# Patient Record
Sex: FEMALE | Race: WHITE | Employment: OTHER | URBAN - METROPOLITAN AREA
[De-identification: names, ages, dates, MRNs, and addresses within clinical notes are randomized per-mention and may not be internally consistent; named-entity substitution may affect disease eponyms.]

---

## 2022-11-30 ENCOUNTER — HOSPITAL ENCOUNTER (INPATIENT)
Age: 67
LOS: 1 days | Discharge: HOME | DRG: 066 | End: 2022-12-01
Attending: EMERGENCY MEDICINE | Admitting: INTERNAL MEDICINE
Payer: MEDICARE

## 2022-11-30 ENCOUNTER — APPOINTMENT (OUTPATIENT)
Dept: CT IMAGING | Age: 67
DRG: 066 | End: 2022-11-30
Payer: MEDICARE

## 2022-11-30 ENCOUNTER — APPOINTMENT (OUTPATIENT)
Dept: MRI IMAGING | Age: 67
DRG: 066 | End: 2022-11-30
Payer: MEDICARE

## 2022-11-30 DIAGNOSIS — H53.2 DIPLOPIA: ICD-10-CM

## 2022-11-30 DIAGNOSIS — I63.9 ACUTE CVA (CEREBROVASCULAR ACCIDENT) (HCC): ICD-10-CM

## 2022-11-30 DIAGNOSIS — R42 VERTIGO: Primary | ICD-10-CM

## 2022-11-30 DIAGNOSIS — R29.810 FACIAL DROOP: ICD-10-CM

## 2022-11-30 LAB
ANION GAP SERPL CALCULATED.3IONS-SCNC: 10 MMOL/L (ref 3–16)
APTT: 25.1 SEC (ref 23–34.3)
BASOPHILS ABSOLUTE: 0 K/UL (ref 0–0.2)
BASOPHILS RELATIVE PERCENT: 0.5 %
BUN BLDV-MCNC: 14 MG/DL (ref 7–20)
CALCIUM SERPL-MCNC: 9.6 MG/DL (ref 8.3–10.6)
CHLORIDE BLD-SCNC: 101 MMOL/L (ref 99–110)
CO2: 26 MMOL/L (ref 21–32)
CREAT SERPL-MCNC: 0.6 MG/DL (ref 0.6–1.2)
EKG ATRIAL RATE: 70 BPM
EKG DIAGNOSIS: NORMAL
EKG P AXIS: -2 DEGREES
EKG P-R INTERVAL: 148 MS
EKG Q-T INTERVAL: 420 MS
EKG QRS DURATION: 90 MS
EKG QTC CALCULATION (BAZETT): 453 MS
EKG R AXIS: 9 DEGREES
EKG T AXIS: 35 DEGREES
EKG VENTRICULAR RATE: 70 BPM
EOSINOPHILS ABSOLUTE: 0.2 K/UL (ref 0–0.6)
EOSINOPHILS RELATIVE PERCENT: 2.5 %
GFR SERPL CREATININE-BSD FRML MDRD: >60 ML/MIN/{1.73_M2}
GLUCOSE BLD-MCNC: 85 MG/DL (ref 70–99)
GLUCOSE BLD-MCNC: 92 MG/DL (ref 70–99)
HCT VFR BLD CALC: 42.8 % (ref 36–48)
HEMOGLOBIN: 14.4 G/DL (ref 12–16)
INR BLD: 0.99 (ref 0.87–1.14)
LYMPHOCYTES ABSOLUTE: 2.1 K/UL (ref 1–5.1)
LYMPHOCYTES RELATIVE PERCENT: 33.9 %
MCH RBC QN AUTO: 30.3 PG (ref 26–34)
MCHC RBC AUTO-ENTMCNC: 33.7 G/DL (ref 31–36)
MCV RBC AUTO: 90 FL (ref 80–100)
MONOCYTES ABSOLUTE: 0.5 K/UL (ref 0–1.3)
MONOCYTES RELATIVE PERCENT: 9 %
NEUTROPHILS ABSOLUTE: 3.3 K/UL (ref 1.7–7.7)
NEUTROPHILS RELATIVE PERCENT: 54.1 %
PDW BLD-RTO: 13.7 % (ref 12.4–15.4)
PERFORMED ON: NORMAL
PLATELET # BLD: 345 K/UL (ref 135–450)
PMV BLD AUTO: 7.9 FL (ref 5–10.5)
POTASSIUM REFLEX MAGNESIUM: 4.2 MMOL/L (ref 3.5–5.1)
PRO-BNP: 120 PG/ML (ref 0–124)
PROTHROMBIN TIME: 13 SEC (ref 11.7–14.5)
RBC # BLD: 4.76 M/UL (ref 4–5.2)
SODIUM BLD-SCNC: 137 MMOL/L (ref 136–145)
TROPONIN: <0.01 NG/ML
WBC # BLD: 6.1 K/UL (ref 4–11)

## 2022-11-30 PROCEDURE — 83880 ASSAY OF NATRIURETIC PEPTIDE: CPT

## 2022-11-30 PROCEDURE — 80048 BASIC METABOLIC PNL TOTAL CA: CPT

## 2022-11-30 PROCEDURE — 36415 COLL VENOUS BLD VENIPUNCTURE: CPT

## 2022-11-30 PROCEDURE — 2060000000 HC ICU INTERMEDIATE R&B

## 2022-11-30 PROCEDURE — 6370000000 HC RX 637 (ALT 250 FOR IP): Performed by: INTERNAL MEDICINE

## 2022-11-30 PROCEDURE — 70450 CT HEAD/BRAIN W/O DYE: CPT

## 2022-11-30 PROCEDURE — 6360000002 HC RX W HCPCS: Performed by: INTERNAL MEDICINE

## 2022-11-30 PROCEDURE — 85730 THROMBOPLASTIN TIME PARTIAL: CPT

## 2022-11-30 PROCEDURE — 85610 PROTHROMBIN TIME: CPT

## 2022-11-30 PROCEDURE — 6360000004 HC RX CONTRAST MEDICATION: Performed by: EMERGENCY MEDICINE

## 2022-11-30 PROCEDURE — 99285 EMERGENCY DEPT VISIT HI MDM: CPT

## 2022-11-30 PROCEDURE — 70498 CT ANGIOGRAPHY NECK: CPT

## 2022-11-30 PROCEDURE — 4A03X5D MEASUREMENT OF ARTERIAL FLOW, INTRACRANIAL, EXTERNAL APPROACH: ICD-10-PCS | Performed by: INTERNAL MEDICINE

## 2022-11-30 PROCEDURE — 70551 MRI BRAIN STEM W/O DYE: CPT

## 2022-11-30 PROCEDURE — 84484 ASSAY OF TROPONIN QUANT: CPT

## 2022-11-30 PROCEDURE — 85025 COMPLETE CBC W/AUTO DIFF WBC: CPT

## 2022-11-30 PROCEDURE — 93005 ELECTROCARDIOGRAM TRACING: CPT | Performed by: EMERGENCY MEDICINE

## 2022-11-30 RX ORDER — ONDANSETRON 4 MG/1
4 TABLET, ORALLY DISINTEGRATING ORAL EVERY 8 HOURS PRN
Status: DISCONTINUED | OUTPATIENT
Start: 2022-11-30 | End: 2022-12-01 | Stop reason: HOSPADM

## 2022-11-30 RX ORDER — CHLORAL HYDRATE 500 MG
1000 CAPSULE ORAL DAILY
COMMUNITY

## 2022-11-30 RX ORDER — ONDANSETRON 2 MG/ML
4 INJECTION INTRAMUSCULAR; INTRAVENOUS EVERY 6 HOURS PRN
Status: DISCONTINUED | OUTPATIENT
Start: 2022-11-30 | End: 2022-12-01 | Stop reason: HOSPADM

## 2022-11-30 RX ORDER — ASPIRIN 300 MG/1
300 SUPPOSITORY RECTAL DAILY
Status: DISCONTINUED | OUTPATIENT
Start: 2022-11-30 | End: 2022-12-01 | Stop reason: HOSPADM

## 2022-11-30 RX ORDER — ASPIRIN 81 MG/1
81 TABLET ORAL DAILY
Status: DISCONTINUED | OUTPATIENT
Start: 2022-11-30 | End: 2022-12-01 | Stop reason: HOSPADM

## 2022-11-30 RX ORDER — POLYETHYLENE GLYCOL 3350 17 G/17G
17 POWDER, FOR SOLUTION ORAL DAILY PRN
Status: DISCONTINUED | OUTPATIENT
Start: 2022-11-30 | End: 2022-12-01 | Stop reason: HOSPADM

## 2022-11-30 RX ORDER — LABETALOL HYDROCHLORIDE 5 MG/ML
10 INJECTION, SOLUTION INTRAVENOUS EVERY 10 MIN PRN
Status: DISCONTINUED | OUTPATIENT
Start: 2022-11-30 | End: 2022-12-01 | Stop reason: HOSPADM

## 2022-11-30 RX ORDER — ENOXAPARIN SODIUM 100 MG/ML
30 INJECTION SUBCUTANEOUS 2 TIMES DAILY
Status: DISCONTINUED | OUTPATIENT
Start: 2022-11-30 | End: 2022-12-01 | Stop reason: HOSPADM

## 2022-11-30 RX ORDER — ROSUVASTATIN CALCIUM 20 MG/1
40 TABLET, COATED ORAL NIGHTLY
Status: DISCONTINUED | OUTPATIENT
Start: 2022-11-30 | End: 2022-12-01 | Stop reason: HOSPADM

## 2022-11-30 RX ORDER — VITAMIN B COMPLEX
1 CAPSULE ORAL DAILY
COMMUNITY

## 2022-11-30 RX ORDER — M-VIT,TX,IRON,MINS/CALC/FOLIC 27MG-0.4MG
1 TABLET ORAL DAILY
COMMUNITY

## 2022-11-30 RX ORDER — ASPIRIN 81 MG/1
81 TABLET ORAL DAILY
COMMUNITY

## 2022-11-30 RX ADMIN — ROSUVASTATIN CALCIUM 40 MG: 20 TABLET, COATED ORAL at 22:49

## 2022-11-30 RX ADMIN — IOPAMIDOL 75 ML: 755 INJECTION, SOLUTION INTRAVENOUS at 12:35

## 2022-11-30 RX ADMIN — ENOXAPARIN SODIUM 30 MG: 100 INJECTION SUBCUTANEOUS at 22:49

## 2022-11-30 RX ADMIN — ASPIRIN 81 MG: 81 TABLET, COATED ORAL at 21:49

## 2022-11-30 ASSESSMENT — ENCOUNTER SYMPTOMS
TROUBLE SWALLOWING: 0
RESPIRATORY NEGATIVE: 1
VOICE CHANGE: 0
SORE THROAT: 0
DIARRHEA: 0
COUGH: 0
VOMITING: 0
ABDOMINAL PAIN: 0
RHINORRHEA: 0
SHORTNESS OF BREATH: 0
NAUSEA: 1

## 2022-11-30 NOTE — ED NOTES
Pt resting in bed with lights dimmed and family present at bs.  Pt waiting on admission orders and bed assignment     Hollie Dawson RN  11/30/22 2110

## 2022-11-30 NOTE — H&P
Hospital Medicine History & Physical      PCP: No primary care provider on file. Date of Admission: 11/30/2022    Date of Service: Pt seen/examined on 11/30/22 and Admitted to Inpatient with expected LOS greater than two midnights due to medical therapy. Chief Complaint:  Diplopia (Pt states that she has been seeing double and dizzy since 0730 this am. )      History Of Present Illness:    78 y.o. female Bee Lujan  -History of suburethral sling procedure, urgent continence, osteoarthritis  -She could not sleep last night, she states she was just looking in the mirror overnight from 2:30 AM to 5 AM, she probably slept around that time, 7:30 AM woke up with dizziness and double vision. She had a blood test and was able to go to sleep, when she woke up again at 10 AM she still had dizziness, some noted facial droop on the left, she was brought to the emergency room for further evaluation. In the emergency room she is noted to be hypertensive to 135/114, labs unremarkable, with no other lateralizing symptoms. CT head did not show acute intracranial hemorrhage or mass-effect. CTA showed mild atherosclerosis no large vessel occlusion. With persistent dizziness and diplopia patient admitted to hospital for further evaluation with MRI, neurology consult and frequent neurochecks. Past Medical History:    As above in HPI    Past Surgical History:    Past Surgical History:   Procedure Laterality Date    COLONOSCOPY 01/14/14   Dr. Tatianna Brar repeat 10 years    DILATION/CURETTAGE,DIAGNOSTIC 2005    HAND SURGERY 1980   ganglion cyst excision    HAND SURGERY 2004   trigger thumb    REMOVAL OF TONSILS,<12 Y/O    SLING OPER 5742 [facility]   2005 [facility] for bleeding. 2008 Tension Free Viginal Tape bladder    Medications Prior to Admission:   Pharmacist to reconcile medications    Allergies:  Patient has no known allergies.     Social History:      The patient currently lives at home with family  Currently does not work but does a lot of volunteer work at the [employer]:   reports that she has never smoked. She has never used smokeless tobacco.  ETOH:   reports current alcohol use. Family History: History of father with CVA in his 62s he was a physician and had to stop working because of this    History reviewed. No pertinent family history. REVIEW OF SYSTEMS:   Pertinent positives as noted in the HPI. All other systems reviewed and negative. PHYSICAL EXAM PERFORMED:    /72   Pulse 79   Temp 98.6 °F (37 °C) (Oral)   Resp 21   Ht 5' 8\" (1.727 m)   Wt 251 lb (113.9 kg)   SpO2 98%   BMI 38.16 kg/m²     General appearance:  No apparent distress, appears stated age and cooperative. HEENT:  Normal cephalic, atraumatic without obvious deformity. Pupils equal, round, and reactive to light. Extra ocular muscles intact. Conjunctivae/corneas clear. Neck: Supple, with full range of motion. No jugular venous distention. Trachea midline. Respiratory:  Normal respiratory effort. Clear to auscultation, bilaterally without Rales/Wheezes/Rhonchi. Cardiovascular:  Regular rate and rhythm with normal S1/S2 without murmurs, rubs or gallops. Abdomen: Soft, non-tender, non-distended with normal bowel sounds. Musculoskeletal:  No clubbing, cyanosis or edema bilaterally. Full range of motion without deformity. Skin: Skin color, texture, turgor normal.  No rashes or lesions.   Neurologic: Difficulty with finger-nose testing pointing, motor strength is 5 out of 5, no sensory deficits,Cranial nerves: subtle left nasolabial flattening otherwise grossly non-focal.  Psychiatric:  Alert and oriented, thought content appropriate, normal insight  Capillary Refill: Brisk,< 3 seconds   Peripheral Pulses: +2 palpable, equal bilaterally       Labs:     Recent Labs     11/30/22  1312   WBC 6.1   HGB 14.4   HCT 42.8        Recent Labs     11/30/22  1312      K 4.2      CO2 26   BUN 14   CREATININE 0.6   CALCIUM 9.6     No results for input(s): AST, ALT, BILIDIR, BILITOT, ALKPHOS in the last 72 hours. Recent Labs     11/30/22  1312   INR 0.99     Recent Labs     11/30/22  1312   TROPONINI <0.01       Urinalysis:    No results found for: Isabella Bar, BACTERIA, RBCUA, BLOODU, [address], GLUCOSEU    Radiology:     CXR: I have reviewed the CXR with the following interpretation: n/a  EKG:  I have reviewed the EKG with the following interpretation: Reviewed    CTA HEAD NECK W CONTRAST   Final Result      Mild atherosclerosis. No steno-occlusive disease. CT HEAD WO CONTRAST   Final Result      No acute intracranial hemorrhage or mass effect. Result communicated to Dr. Levi Rowan 12:34 PM on 11/30/22          ASSESSMENT/PLAN:    Active Hospital Problems    Diagnosis Date Noted    Diplopia [H53.2] 11/30/2022     Priority: Medium     Diplopia/dizziness, left-sided facial droop, concern for CVA, CT head and CTA did not show acute findings. Family hx of CVA in father. MRI without contrast has been ordered. Neurology consult. Neurochecks every 4 hours  Aspirin, high intensity statin  Check A1c, lipid profile  PT/OT/SLP has been consulted  Can do bedside swallow exam and start regular diet  Monitor on telemetry  Permissive hypertension, labetalol for SBP greater than 497 and diastolic 062    Obesity Body mass index is 38.16 kg/m². - Complicating assessment and treatment. Placing patient at risk for multiple co-morbidities as well as early death and contributing to the patient's presentation.  on weight loss when appropriate. DVT Prophylaxis: Lovenox  Diet: Advance diet if passes bedside swallow  Code Status: full code    PT/OT Eval Status: ordered    [facility] as inpatient. I anticipate hospitalization spanning more than two midnights for investigation and treatment of the above medically necessary diagnoses.          Manasa Francis MD    Thank you No primary care provider on file. for the opportunity to be involved in this patient's care. If you have any questions or concerns please feel free to contact me at [phone].

## 2022-11-30 NOTE — ED PROVIDER NOTES
[address]          ATTENDING PHYSICIAN NOTE       Date of evaluation: 11/30/2022    Chief Complaint     Diplopia (Pt states that she has been seeing double and dizzy since 0730 this am. )      History of Present Illness     Hannah Shipley is a 78 y.o. female who presents to the emergency department with complaints of dizziness and double vision and a left-sided facial droop. Patient states that she had difficulty sleeping during the early morning hours this morning, and was awake from approximately 2:30 AM till 5 AM, tossing and turning and having difficulty sleeping, but otherwise felt normal at that time, with no double vision, dizziness, or complaints of weakness. She describes that she woke with her alarm at 730 this morning, and at that time felt dizzy and unsteady when she attempted to get up out of bed to go to the bathroom. She was ultimately able to get up, had some breakfast and saw her son. He recalls noting that she seemed unsteady and was complaining of dizziness at that time, but does not recall noticing any facial droop. She went back to lay down in bed until about 10 AM, when she got up again this time her symptoms were not improved and her son at this point noticed a left-sided facial droop and some slurring of speech. For this reason she was brought to the emergency department for evaluation. Patient states that she has been feeling well until she woke up at 730 this morning. She denies any present or antecedent chest pain, shortness of breath, palpitations. She does note a mild generalized headache. She does note some mild nausea, but has had no vomiting. She states that at one point she felt as though her left arm was heavy, at a different point she felt as though her right arm was heavy and she had difficulty drinking her coffee. She states that her legs have felt heavy, but cannot notice any lateralization to this.     Review of Systems Review of Systems   Constitutional:  Negative for activity change, appetite change, chills, fatigue and fever. HENT: Negative. Negative for congestion, rhinorrhea, sore throat, trouble swallowing and voice change. Eyes:  Positive for visual disturbance. Respiratory: Negative. Negative for cough and shortness of breath. Cardiovascular: Negative. Negative for chest pain and palpitations. Gastrointestinal:  Positive for nausea. Negative for abdominal pain, diarrhea and vomiting. Genitourinary: Negative. Negative for difficulty urinating and dysuria. Musculoskeletal: Negative. Skin: Negative. Neurological:  Positive for dizziness, facial asymmetry, speech difficulty, weakness and headaches. Negative for syncope. Psychiatric/Behavioral: Negative. Past Medical, Surgical, Family, and Social History     She has no past medical history on file. She has no past surgical history on file. Her family history is not on file. She reports that she has never smoked. She has never used smokeless tobacco. She reports current alcohol use. She reports that she does not use drugs. Medications     Previous Medications    No medications on file       Allergies     She has No Known Allergies. Physical Exam     INITIAL VITALS: BP: (!) 135/114, Temp: 98.6 °F (37 °C), Heart Rate: 65, Resp: 16, SpO2: 99 %     General: Well appearing. Uncomfortable appearing, keeps her eyes closed for comfort, but in no acute respiratory distress. HEENT: Pupils are equal, round, and reactive to light. On extraocular muscle testing, the patient cannot elevate her gaze with either eye. She has some bilateral mild nystagmus on lateral gaze. Her diplopia resolves with rightward gaze. Conjunctivae are clear and moist. No redness or drainage from the eyes. No drainage from the nose. Left-sided facial droop is present. The oropharynx appeared to be normal.    Neck: Supple, with full range of motion. Cardiovascular: Normal S1-S2 without murmur rub or gallop. 2+ radial pulses bilaterally. Respiratory: Unlabored breathing with equal chest rise and fall. Lungs are clear to auscultation bilaterally. No adventitious lung sounds heard. Abdomen: Soft and nontender, without guarding or rebound tenderness. No masses or hepatosplenomegaly. Skin: Warm and dry, without rashes or ecchymoses, lacerations or abrasions. Neuro: Alert and oriented x3. NIHSS:  LOC - 0, 0, 0  Gaze - 0  Vision - 0  Face - 1  Arms - R 0, L 0  Legs - R 0, L 0  Sensory - 0  Ataxia - 0  Aphasia - 0  Dysarthria - 1  Extinction/Neglect - 0  Total = 2    This finding of the left nasolabial fold, although she can activate muscles with smile. No drift on upper or lower extremities, normal sensation throughout. Mildly slurred speech, which the son notices, but easily understandable. On testing of extraocular muscles, the patient has full lateral movements bilaterally, and diplopia resolves on rightward gaze. However, the patient cannot elevate gaze bilaterally    Extremities: Warm and well-perfused, without clubbing, cyanosis, or edema. The patient moves all extremities equally. Psych: The patient's mood and affect are generally within normal limits for their presentation. Diagnostic Results     EKG   Normal sinus rhythm with a ventricular rate of 70 bpm.  Normal axis. Normal intervals, with FL interval 148 ms, QRS duration 90 ms,  ms. There is some T wave flattening in lead III only. No other ST segment or T wave abnormalities are noted. There is no prior EKG in our system to compare to. RADIOLOGY:  CTA HEAD NECK W CONTRAST   Final Result      Mild atherosclerosis. No steno-occlusive disease. CT HEAD WO CONTRAST   Final Result      No acute intracranial hemorrhage or mass effect.       Result communicated to Dr. Jay Lauren 12:34 PM on 11/30/22          LABS:   Results for orders placed or performed during the hospital encounter of 11/30/22   CBC with Auto Differential   Result Value Ref Range    WBC 6.1 4.0 - 11.0 K/uL    RBC 4.76 4.00 - 5.20 M/uL    Hemoglobin 14.4 12.0 - 16.0 g/dL    Hematocrit 42.8 36.0 - 48.0 %    MCV 90.0 80.0 - 100.0 fL    MCH 30.3 26.0 - 34.0 pg    MCHC 33.7 31.0 - 36.0 g/dL    RDW 13.7 12.4 - 15.4 %    Platelets 881 731 - 392 K/uL    MPV 7.9 5.0 - 10.5 fL    Neutrophils % 54.1 %    Lymphocytes % 33.9 %    Monocytes % 9.0 %    Eosinophils % 2.5 %    Basophils % 0.5 %    Neutrophils Absolute 3.3 1.7 - 7.7 K/uL    Lymphocytes Absolute 2.1 1.0 - 5.1 K/uL    Monocytes Absolute 0.5 0.0 - 1.3 K/uL    Eosinophils Absolute 0.2 0.0 - 0.6 K/uL    Basophils Absolute 0.0 0.0 - 0.2 K/uL   Basic Metabolic Panel w/ Reflex to MG   Result Value Ref Range    Sodium 137 136 - 145 mmol/L    Potassium reflex Magnesium 4.2 3.5 - 5.1 mmol/L    Chloride 101 99 - 110 mmol/L    CO2 26 21 - 32 mmol/L    Anion Gap 10 3 - 16    Glucose 92 70 - 99 mg/dL    BUN 14 7 - 20 mg/dL    Creatinine 0.6 0.6 - 1.2 mg/dL    Est, Glom Filt Rate >60 >60    Calcium 9.6 8.3 - 10.6 mg/dL   Protime-INR   Result Value Ref Range    Protime 13.0 11.7 - 14.5 sec    INR 0.99 0.87 - 1.14   PTT   Result Value Ref Range    aPTT 25.1 23.0 - 34.3 sec   Brain Natriuretic Peptide   Result Value Ref Range    Pro- 0 - 124 pg/mL   Troponin   Result Value Ref Range    Troponin <0.01 <0.01 ng/mL   POCT Glucose   Result Value Ref Range    POC Glucose 85 70 - 99 mg/dl    Performed on ACCU-CHEK    EKG 12 Lead   Result Value Ref Range    Ventricular Rate 70 BPM    Atrial Rate 70 BPM    P-R Interval 148 ms    QRS Duration 90 ms    Q-T Interval 420 ms    QTc Calculation (Bazett) 453 ms    P Axis -2 degrees    R Axis 9 degrees    T Axis 35 degrees    Diagnosis       EKG performed in ER and to be interpreted by ER physician. Confirmed by MD, ER ([id number]),  Guy Joseph ([id number]) on 11/30/2022 12:36:35 PM       RECENT VITALS:  BP: 135/72, Temp: 98.6 °F (37 °C), Heart Rate: 79, Resp: 21, SpO2: 98 %     Procedures         ED Course     Nursing Notes, Past Medical Hx, Past Surgical Hx, Social Hx, Allergies, and Family Hx were reviewed. The patient was given the following medications:  Orders Placed This Encounter   Medications    iopamidol (ISOVUE-370) 76 % injection 75 mL       CONSULTS:  Alexander Cardona / LANG / Nelly Ashley is a 78 y.o. female with no prior history of stroke, who presents to the emergency department with symptoms of significant dizziness, vertical diplopia, left-sided facial droop, mildly slurred speech, difficulty ambulating, concerning for posterior circulation stroke. She was last known well at 5 AM, which is when she fell asleep after having been awake in the early morning hours, and symptom recognition was 7:30 AM, which is when she was awoken by her alarm and noted the symptoms. Stroke alert was called based on the patient's symptomatology, although she is outside the window for systemic lytic therapy. CT head shows no bleed. CT angiogram shows no large vessel occlusion. Patient's case was discussed with the stroke team, who did not recommend acute intervention, but recommended admission for MRI and neurology consultation. Critical Care:  Due to the immediate potential for life-threatening deterioration due to concern for posterior circulation stroke, I spent a total recommend a 37 minutes providing critical care. This time excludes time spent performing procedures but includes time spent on direct patient care, history retrieval, review of the chart, and discussions with patient, family, and consultant(s). Clinical Impression     1. Vertigo    2. Diplopia    3. Facial droop        Disposition     PATIENT REFERRED TO:  No follow-up provider specified.     DISCHARGE MEDICATIONS:  New Prescriptions    No medications on file       DISPOSITION Decision To Admit    (Please note that portions of this note were completed with voice recognition software.   Efforts were made to edit the dictations but occasionally words are mis-transcribed.)     Chantell Albarran MD  11/30/22 1674

## 2022-11-30 NOTE — CONSULTS
Clinical Pharmacy Consult Note  Medication History     Admit Date: 11/30/2022    Pharmacy consulted to verify home medication list by Dr. Niko Medeiros. List of of current medications patient is taking is complete. Home Medication list in EPIC updated to reflect changes noted below. Source of information: patient/family interview, [facility] records    Patient's home pharmacy: [address] [place] [address] - Ph: [phone]    Changes made to medication list:   Medications removed: (include reason, ex: therapy completed, patient no longer taking, etc.)  None at this time  Medications added:   Aspirin  B complex  Fish oil  Multivitamin   Vitamin D  Medication doses adjusted:   None at this time  Other notes:   Patient states that she takes no prescription medications, only supplements/over-the-counter medications. Over-the-counter medications that patient could recall were added to home medication list at time of consult. Current Outpatient Medications   Medication Instructions    aspirin 81 mg, Oral, DAILY    b complex vitamins capsule 1 capsule, Oral, DAILY    fish oil 1,000 mg, Oral, DAILY    Multiple Vitamins-Minerals (THERAPEUTIC MULTIVITAMIN-MINERALS) tablet 1 tablet, Oral, DAILY    vitamin D (CHOLECALCIFEROL) 1,000 Units, Oral, DAILY       Please call with any questions.   Ashley Jiménez, PharmD, [address]  Clinical Pharmacist - Emergency Dept  Wireless: [phone]  11/30/2022 3:59 PM

## 2022-12-01 VITALS
OXYGEN SATURATION: 98 % | RESPIRATION RATE: 18 BRPM | HEART RATE: 74 BPM | DIASTOLIC BLOOD PRESSURE: 61 MMHG | HEIGHT: 68 IN | SYSTOLIC BLOOD PRESSURE: 146 MMHG | BODY MASS INDEX: 36.95 KG/M2 | WEIGHT: 243.83 LBS | TEMPERATURE: 98.2 F

## 2022-12-01 PROBLEM — E78.5 HLD (HYPERLIPIDEMIA): Status: ACTIVE | Noted: 2022-12-01

## 2022-12-01 PROBLEM — I63.9 BRAINSTEM STROKE (HCC): Status: ACTIVE | Noted: 2022-12-01

## 2022-12-01 PROBLEM — I63.81 THALAMIC STROKE (HCC): Status: ACTIVE | Noted: 2022-11-30

## 2022-12-01 PROBLEM — I10 BENIGN ESSENTIAL HTN: Status: ACTIVE | Noted: 2022-12-01

## 2022-12-01 PROBLEM — R42 VERTIGO: Status: ACTIVE | Noted: 2022-12-01

## 2022-12-01 LAB
CHOLESTEROL, TOTAL: 248 MG/DL (ref 0–199)
ESTIMATED AVERAGE GLUCOSE: 111.2 MG/DL
HBA1C MFR BLD: 5.5 %
HCT VFR BLD CALC: 43.9 % (ref 36–48)
HDLC SERPL-MCNC: 78 MG/DL (ref 40–60)
HEMOGLOBIN: 14.8 G/DL (ref 12–16)
LDL CHOLESTEROL CALCULATED: 151 MG/DL
MCH RBC QN AUTO: 30.6 PG (ref 26–34)
MCHC RBC AUTO-ENTMCNC: 33.7 G/DL (ref 31–36)
MCV RBC AUTO: 91 FL (ref 80–100)
PDW BLD-RTO: 13.4 % (ref 12.4–15.4)
PLATELET # BLD: 322 K/UL (ref 135–450)
PMV BLD AUTO: 7.4 FL (ref 5–10.5)
RBC # BLD: 4.83 M/UL (ref 4–5.2)
TRIGL SERPL-MCNC: 94 MG/DL (ref 0–150)
VLDLC SERPL CALC-MCNC: 19 MG/DL
WBC # BLD: 7.5 K/UL (ref 4–11)

## 2022-12-01 PROCEDURE — 97166 OT EVAL MOD COMPLEX 45 MIN: CPT

## 2022-12-01 PROCEDURE — 97116 GAIT TRAINING THERAPY: CPT

## 2022-12-01 PROCEDURE — 36415 COLL VENOUS BLD VENIPUNCTURE: CPT

## 2022-12-01 PROCEDURE — 6370000000 HC RX 637 (ALT 250 FOR IP): Performed by: INTERNAL MEDICINE

## 2022-12-01 PROCEDURE — 99223 1ST HOSP IP/OBS HIGH 75: CPT | Performed by: PSYCHIATRY & NEUROLOGY

## 2022-12-01 PROCEDURE — 97530 THERAPEUTIC ACTIVITIES: CPT

## 2022-12-01 PROCEDURE — 6360000002 HC RX W HCPCS: Performed by: INTERNAL MEDICINE

## 2022-12-01 PROCEDURE — 85027 COMPLETE CBC AUTOMATED: CPT

## 2022-12-01 PROCEDURE — 80061 LIPID PANEL: CPT

## 2022-12-01 PROCEDURE — 97162 PT EVAL MOD COMPLEX 30 MIN: CPT

## 2022-12-01 PROCEDURE — 83036 HEMOGLOBIN GLYCOSYLATED A1C: CPT

## 2022-12-01 PROCEDURE — 93306 TTE W/DOPPLER COMPLETE: CPT

## 2022-12-01 PROCEDURE — 97535 SELF CARE MNGMENT TRAINING: CPT

## 2022-12-01 PROCEDURE — 92610 EVALUATE SWALLOWING FUNCTION: CPT

## 2022-12-01 PROCEDURE — 6370000000 HC RX 637 (ALT 250 FOR IP): Performed by: STUDENT IN AN ORGANIZED HEALTH CARE EDUCATION/TRAINING PROGRAM

## 2022-12-01 RX ORDER — AMLODIPINE BESYLATE 5 MG/1
5 TABLET ORAL DAILY
Qty: 30 TABLET | Refills: 3 | Status: SHIPPED | OUTPATIENT
Start: 2022-12-01

## 2022-12-01 RX ORDER — ROSUVASTATIN CALCIUM 40 MG/1
40 TABLET, COATED ORAL NIGHTLY
Qty: 30 TABLET | Refills: 3 | Status: SHIPPED | OUTPATIENT
Start: 2022-12-01

## 2022-12-01 RX ORDER — AMLODIPINE BESYLATE 5 MG/1
5 TABLET ORAL DAILY
Status: DISCONTINUED | OUTPATIENT
Start: 2022-12-01 | End: 2022-12-01 | Stop reason: HOSPADM

## 2022-12-01 RX ORDER — CLOPIDOGREL BISULFATE 75 MG/1
75 TABLET ORAL DAILY
Status: DISCONTINUED | OUTPATIENT
Start: 2022-12-01 | End: 2022-12-01 | Stop reason: HOSPADM

## 2022-12-01 RX ORDER — CLOPIDOGREL BISULFATE 75 MG/1
75 TABLET ORAL DAILY
Qty: 21 TABLET | Refills: 0 | Status: SHIPPED | OUTPATIENT
Start: 2022-12-01 | End: 2022-12-22

## 2022-12-01 RX ORDER — ACETAMINOPHEN 325 MG/1
650 TABLET ORAL EVERY 4 HOURS PRN
Status: DISCONTINUED | OUTPATIENT
Start: 2022-12-01 | End: 2022-12-01 | Stop reason: HOSPADM

## 2022-12-01 RX ADMIN — ASPIRIN 81 MG: 81 TABLET, COATED ORAL at 08:31

## 2022-12-01 RX ADMIN — CLOPIDOGREL BISULFATE 75 MG: 75 TABLET ORAL at 13:06

## 2022-12-01 RX ADMIN — ENOXAPARIN SODIUM 30 MG: 100 INJECTION SUBCUTANEOUS at 08:31

## 2022-12-01 RX ADMIN — AMLODIPINE BESYLATE 5 MG: 5 TABLET ORAL at 13:06

## 2022-12-01 RX ADMIN — ACETAMINOPHEN 650 MG: 325 TABLET ORAL at 10:26

## 2022-12-01 ASSESSMENT — PAIN DESCRIPTION - LOCATION
LOCATION: HEAD
LOCATION: HEAD

## 2022-12-01 ASSESSMENT — PAIN SCALES - GENERAL
PAINLEVEL_OUTOF10: 2
PAINLEVEL_OUTOF10: 3

## 2022-12-01 NOTE — CONSULTS
Neurology / Neurocritical Care Consult Note    Paul Gongora MD is requesting this consult. Reason for Consult: Diplopia  Admission Chief Complaint: Diplopia    History of Present Illness     Dixie Smith is a 78 y.o. y/o female with PMH significant for hyperlipidemia presented with unsteady gait, slurred speech, left facial weakness. Patient reported approximately 2 AM.  She was trying to sleep was not able to sleep 5:30 AM when she endorsed cough. She woke up around 7:30 AM with dizziness and double vision. She tried to go back to sleep and woke up again around 11 AM with persistent dizziness. Patient tried to walk down the stairs and noticed her gait was extremely unsteady. The patient's son noticed patient had left facial droop as well as slurred speech with slurring. Patient was brought to the emergency department for further evaluation. In the ED, patient's blood pressure 140s to 150s/100s. Code stroke was called. Patient received a dose of aspirin 81 and statin. CT head without contrast nonrevealing. CTA head and neck with contrast revealing mild atherosclerosis. No steno-occlusive disease. MRI brain without contrast revealed small acute infarct in the right medial thalamus. Neurology was consulted and patient was admitted for further management of ischemic stroke. Per my interview with the patient, shewas lying in bed, reported having mild headache likely from persistent diplopia. Denied any nausea or vomiting episodes. Reported her speech has significantly improved and is not slurring anymore, likely back to baseline. She also reported during her car ride to the ED, she was leaning left side as well as had some left lower extremity weakness. Left-sided weakness has since resolved. REVIEW OF SYSTEMS:   Constitutional- No weight loss or fevers   Eyes- + diplopia. No photophobia. Ears/nose/throat- No dysphagia. No Dysarthria   Cardiovascular- No palpitations.  No chest pain   Respiratory- No dyspnea. No Cough   Gastrointestinal- No Abdominal pain. No Vomiting. Genitourinary- No incontinence. No urinary retention   Musculoskeletal- No myalgia. No arthralgia   Skin- No rash. No easy bruising. Psychiatric- No depression. No anxiety   Endocrine- No diabetes. No thyroid issues. Hematologic- No bleeding difficulty. No fatigue   Neurologic-persistent diplopia. Past Medical, Surgical, Family, and Social History   PAST MEDICAL HISTORY:  History reviewed. No pertinent past medical history. SURGICAL HISTORY:  History reviewed. No pertinent surgical history. FAMILY HISTORY & SOCIAL HISTORY:  Family history non-contributory  History reviewed. No pertinent family history.   Social History     Tobacco Use    Smoking status: Never    Smokeless tobacco: Never   Substance Use Topics    Alcohol use: Yes     Comment: occ    Drug use: Never         Allergies & Outpatient Medications   ALLERGIES:  No Known Allergies  HOME MEDICATIONS:  Current Discharge Medication List        CONTINUE these medications which have NOT CHANGED    Details   aspirin 81 MG EC tablet Take 81 mg by mouth daily      Multiple Vitamins-Minerals (THERAPEUTIC MULTIVITAMIN-MINERALS) tablet Take 1 tablet by mouth daily      b complex vitamins capsule Take 1 capsule by mouth daily      vitamin D (CHOLECALCIFEROL) 25 MCG (1000 UT) TABS tablet Take 1,000 Units by mouth daily      Omega-3 Fatty Acids (FISH OIL) 1000 MG capsule Take 1,000 mg by mouth daily               Physical Exam   PHYSICAL EXAM:  Vitals:    11/30/22 2217 12/01/22 0300 12/01/22 0413 12/01/22 0823   BP: (!) 153/91 122/64  (!) 151/78   Pulse: 76 82  73   Resp: 22 20  18   Temp: 98.5 °F (36.9 °C) 97.9 °F (36.6 °C)  98.2 °F (36.8 °C)   TempSrc: Oral Oral  Oral   SpO2: 97% 95%  98%   Weight:   243 lb 13.3 oz (110.6 kg)    Height:   5' 8\" (1.727 m)          General: Alert, no distress, well-nourished  Neurologic  Mental status: AOx4  orientation to person, place, time, situation   Attention intact as able to attend well to the exam     Language fluent in conversation   Comprehension intact; follows simple commands    Cranial nerves:   CN2: Visual fields full w/o extinction on confrontational testing   Fundoscopic Exam not performed  CN 3,4,6: Pupils equal and reactive to light, extraocular muscles left eye slow. CN5: Facial sensation symmetric   CN7: Face asymmetric, left facial droop  CN8: Hearing symmetric to spoken voice  CN9: Palate elevated symmetrically  CN11: Traps full strength on shoulder shrug  CN12: Tongue midline with protrusion    Motor Exam:  Left upper extremity slightly weaker compared to the right side    Sensory: light touch intact and symmetric in all 4 extremities. No sensory extinction on bilateral simultaneous stimulation  Cerebellar/coordination: finger nose finger normal without ataxia  Tone: normal in all 4 extremities  Gait: Unsteady given patient has persistent diplopia      OTHER SYSTEMS:  Cardiovascular: Warm, appears well perfused   Respiratory: Easy, non-labored respiratory pattern   Abdominal: Abdomen is without distention   Extremities: Upper and lower extremities are atraumatic in appearance without deformity. No swelling or erythema. Psychiatric: Cooperative with exam    Diagnostic Testing Results   IMAGES:  Images personally reviewed and agree w/ radiology interpretation. Head CT w/o Contrast:  No acute intracranial hemorrhage or mass-effect. CTA of Head / Neck w/ Contrast:  Mild atherosclerosis. No steno-occlusive disease. MRI Brain w/o Contrast:  Small acute infarct in the medial right thalamus. Mild periventricular white matter changes consistent with chronic small vessel ischemic disease. MRI Brain w & w/o Contrast:  Not performed    ECHO with Bubble:   EF 55 to 60% no regional wall motion abnormalities. Diastolic filling revealing grade 1 diastolic dysfunction. No PFO/shunting. CVEEG:  Not indicated.     LABS:  All results below personally reviewed. Pertinent positives & negatives are addressed in Impression & Recommendations below. LABS   Metabolic Panel Recent Labs     11/30/22  1312      K 4.2      CO2 26   BUN 14   CREATININE 0.6   GLUCOSE 92   CALCIUM 9.6      CBC / Coags Recent Labs     11/30/22  1312 12/01/22  0538   WBC 6.1 7.5   RBC 4.76 4.83   HGB 14.4 14.8   HCT 42.8 43.9    322   INR 0.99  --       Other No results for input(s): LABA1C, LDLCALC, TRIG, TSH, OPLEWGPC26, FOLATE, LABSALI, COVID19 in the last 72 hours. No results for input(s): PHENYTOIN, KEPPRA, LACOSA, LAMO, VALPROATE, LACTSEPSIS, LACTA in the last 72 hours. CURRENT SCHEDULED MEDICATIONS   Inpatient Medications     enoxaparin, 30 mg, SubCUTAneous, BID    aspirin, 81 mg, Oral, Daily **OR** aspirin, 300 mg, Rectal, Daily    rosuvastatin, 40 mg, Oral, Nightly   Infusions      Antibiotics   Recent Abx Admin        No antibiotic orders with administrations found. IMPRESSION & RECOMMENDATIONS     IMPRESSION:  79-year-old female with past medical history of hyperlipidemia not on any statins, undiagnosed hypertension not on any antihypertensive medications presented with diplopia resulting in gait unsteadiness and left-sided facial droop with left side weakness was diagnosed with small acute infarct in the right medial thalamus.       RECOMMENDATIONS:    ASA 81 daily  High intensity statin daily  PT/OT  SLP  Lipid panel  A1c  Blood pressure management per primary team          Tod Archuleta MD   Neurology & Neurocritical Care   12/1/2022 9:43 AM      ICU Patients:   [address] [phone]  [facility]: [facility] Neurocritical Care    Floor / PCU Patients:  Neurology Line: [phone]  [facility]: [facility] Neurology    Time independently spent by Nurse Practitioner reviewing chart and prior testing, obtaining history from patient, examining patient, ordering appropriate medications / diagnostics, reviewing results of diagnostics, counseling and educating patient / family, communicating plan with other providers and documenting clinical information in the EMR was approximately 30 minutes.

## 2022-12-01 NOTE — PROGRESS NOTES
4 Eyes Skin Assessment     The patient is being assess for   Admission    I agree that 2 RN's have performed a thorough Head to Toe Skin Assessment on the patient. ALL assessment sites listed below have been assessed. Areas assessed for pressure by both nurses:   [x]   Head, Face, and Ears   [x]   Shoulders, Back, and Chest, Abdomen  [x]   Arms, Elbows, and Hands   [x]   Coccyx, Sacrum, and Ischium  [x]   Legs, Feet, and Heels        Skin Assessed Under all Medical Devices by both nurses:  NA               All Mepilex Borders were peeled back and area peeked at by both nurses:  No: Na  Please list where Mepilex Borders are located:  NA               **SHARE this note so that the co-signing nurse is able to place an eSignature**    Co-signer eSignature: {Esignature:[id number]}    Does the Patient have Skin Breakdown related to pressure?   No              Marty Prevention initiated:  No   Wound Care Orders initiated:  SEBASTIÁN      [facility] nurse consulted for Pressure Injury (Stage 3,4, Unstageable, DTI, NWPT, Complex wounds)and New or Established Ostomies:  SEBASTIÁN      Primary Nurse eSignature: Electronically signed by Jung Lea RN on 11/30/22 at 11:10 PM EST

## 2022-12-01 NOTE — PLAN OF CARE
Problem: Safety - Adult  Goal: Free from fall injury  Outcome: Progressing   Patient meets Maria [facility] Marshall fall risk guidelines. Non skid footwear with ambulation. Bed in lowest position. Call light in reach. Bed alarm activated. Reminded to call for assistance before exiting bed. Continue safety precautions. Problem: Discharge Planning  Goal: Discharge to home or other facility with appropriate resources  Outcome: Progressing  Flowsheets (Taken 11/30/2022 2217)  Discharge to home or other facility with appropriate resources: Identify barriers to discharge with patient and caregiver   Plans to return home.

## 2022-12-01 NOTE — PROGRESS NOTES
Physical Therapy  Facility/Department: [place] [address] [facility]  Physical Therapy Initial Assessment and Treatment    Name: Scott Dyson  : 1955  MRN: [id number]  Date of Service: 2022    Discharge Recommendations: Scott Dyson scored a 19/24 on the AM-PAC short mobility form. Current research shows that an AM-PAC score of 18 or greater is typically associated with a discharge to the patient's home setting. Based on the patient's AM-PAC score and their current functional mobility deficits, it is recommended that the patient have 2-3 sessions per week of Physical Therapy at d/c to increase the patient's independence. At this time, this patient demonstrates the endurance and safety to discharge home with home PT and a follow up treatment frequency of 2-3x/wk. Please see assessment section for further patient specific details. If patient discharges prior to next session this note will serve as a discharge summary. Please see below for the latest assessment towards goals. PT Equipment Recommendations  Equipment Needed:  (may need rolling walker at d/c - will cont to assess)      Patient Diagnosis(es): The primary encounter diagnosis was Vertigo. Diagnoses of Diplopia and Facial droop were also pertinent to this visit. Past Medical History:  has no past medical history on file. Past Surgical History:  has no past surgical history on file. Assessment   Body Structures, Functions, Activity Limitations Requiring Skilled Therapeutic Intervention: Decreased functional mobility ; Decreased endurance;Decreased balance  Assessment: Pt with decreased independent mobility from baseline. Pt reports normally independent with mobilty without AD. Currently limited by double vision and dizziness. Very guarded with mobility and needing CGA for safety. Should progress well once symptoms improve.   Rec cont skilled PT to maximize mobility and independence  Treatment Diagnosis: impaired functional mobility 2/2 decreased balance and endurance  Decision Making: Medium Complexity  Requires PT Follow-Up: Yes     Plan   Physcial Therapy Plan  General Plan: 5-7 times per week  Current Treatment Recommendations: Balance training, Functional mobility training, Gait training, Stair training, Patient/Caregiver education & training, Equipment evaluation, education, & procurement, Safety education & training  Safety Devices  Type of Devices: Left in chair, Call light within reach, Chair alarm in place, Nurse notified  Restraints  Restraints Initially in Place: No     Restrictions  Position Activity Restriction  Other position/activity restrictions: Up as tolerated     Subjective   General  Chart Reviewed: Yes  Additional Pertinent Hx: Pt is a 78 y.o. female adm 11/30 with diplopia. Pt presented to the emergency department with complaints of dizziness and double vision and a left-sided facial droop. Head CT: neg. CTA head: neg. MRI brain:Small acute infarct in the medial right thalamus. PMH: none significant  Referring Practitioner: Eve Schlatter, MD  Referral Date : 11/30/22  Subjective  Subjective: Pt found supine. Agreeable to PT. Reports seeing double. Denies pain. Pt c/o feeling dizzy throughout session. \"It's better when my eyes are closed. \"         Social/Functional History  Social/Functional History  Lives With: Spouse  Type of Home: House  Home Layout: Two level, Bed/Bath upstairs, 1/2 bath on main level, Laundry in basement  Home Access: Stairs to enter without rails  Entrance Stairs - Number of Steps: 2  Bathroom Shower/Tub: Walk-in shower  Bathroom Toilet: Standard  Bathroom Equipment: Hand-held shower  Home Equipment:  (None)  Has the patient had two or more falls in the past year or any fall with injury in the past year?: No  ADL Assistance: Independent  Homemaking Assistance: Independent  Ambulation Assistance: Independent  Transfer Assistance: Independent  Active : Yes  Occupation: Volunteer work  Type of Occupation: [employer]  Additional Comments: Son is currently staying with pt after recent back surgery.  travels for work. Daughter currently here from out of town  Vision/Hearing  Vision  Vision: Impaired (currently c/o double vision)  Vision Exceptions: Wears glasses at all times  Hearing  Hearing: Within functional limits    Cognition         Objective                 AROM RLE (degrees)  RLE AROM: WFL  AROM LLE (degrees)  LLE AROM : WFL  Strength RLE  Strength RLE:  (hip flex 4/5, otherwise >4+/5)  Strength LLE  Strength LLE:  (hip flex 4/5, otherwise >4+/5)           Bed mobility  Supine to Sit: Stand by assistance  Scooting: Stand by assistance  Transfers  Sit to Stand: Contact guard assistance  Stand to Sit: Contact guard assistance  Ambulation  Device: No Device  Assistance: Contact guard assistance  Quality of Gait: guarded and reaching out for UE support, decreased bilat step length/height, no overt LOB  Distance: 15' x 2  Comments: Pt c/o feeling dizzy throughout session. BP after sitting up at /88. After ambulating to/from bathroom 159/136. After 2-3 minutes rest, 152/80 sitting in chair.   RN informed             Treatment included: bed mobility, transfers, gt, pt education      OutComes Score                                                  AM-PAC Score  AM-PAC Inpatient Mobility Raw Score : 19 (12/01/22 1224)  AM-PAC Inpatient T-Scale Score : 45.44 (12/01/22 1224)  Mobility Inpatient CMS 0-100% Score: 41.77 (12/01/22 1224)  Mobility Inpatient CMS G-Code Modifier : CK (12/01/22 1224)          Tinneti Score       Goals  Short Term Goals  Time Frame for Short Term Goals: By discharge  Short Term Goal 1: Sup to sit independent  Short Term Goal 2: Pt will transfer sit to stand independent  Short Term Goal 3: Pt will amb >100' with LRAD supervision  Short Term Goal 4: Pt will up/down flight of steps with LRAD supervision       Education  Patient Education  Education Given To: Patient  Education Provided: Role of Therapy;Plan of Care;Precautions  Education Provided Comments: importance of OOB, need for assist  Education Method: Verbal  Education Outcome: Verbalized understanding      Therapy Time   Individual Concurrent Group Co-treatment   Time In 1012         Time Out 1050         Minutes 38             Timed Code Treatment Minutes:23       Total Treatment Minutes:  [address], PT

## 2022-12-01 NOTE — PROGRESS NOTES
Occupational Therapy  Facility/Department: [place] [address] [facility]  Occupational Therapy Initial Assessment/Treatment    Name: Ubaldo Calderon  : 1955  MRN: [id number]  Date of Service: 2022    Discharge Recommendations: Ubaldo Calderon scored a 18/24 on the AM-PAC ADL Inpatient form. Current research shows that an AM-PAC score of 18 or greater is typically associated with a discharge to the patient's home setting. Based on the patient's AM-PAC score, and their current ADL deficits, it is recommended that the patient have 2-3 sessions per week of Occupational Therapy at d/c to increase the patient's independence. At this time, this patient demonstrates the endurance and safety to discharge  home with home services and a follow up treatment frequency of 2-3x/wk. Please see assessment section for further patient specific details. If patient discharges prior to next session this note will serve as a discharge summary. Please see below for the latest assessment towards goals. OT Equipment Recommendations  Equipment Needed: Yes  Mobility Devices: ADL Assistive Devices  ADL Assistive Devices: Shower Chair with back       Patient Diagnosis(es): The primary encounter diagnosis was Vertigo. Diagnoses of Diplopia and Facial droop were also pertinent to this visit. Past Medical History:  has no past medical history on file. Past Surgical History:  has no past surgical history on file. Treatment Diagnosis: Impaired ADL and functional mobility      Assessment   Performance deficits / Impairments: Decreased functional mobility ; Decreased endurance;Decreased ADL status; Decreased balance;Decreased vision/visual deficit  Assessment: Pt presents with a decline in functional independence. Pt is from home with  and was independent. Pt was caring for son after his back surgery. Pt c/o diplopia and dizziness and currently req CG for mobility and ADL.   Pt req increased time to perform activities and req frequent rests. Pt has good family support. Will follow for OT services. Treatment Diagnosis: Impaired ADL and functional mobility  Prognosis: Good  Decision Making: Medium Complexity  REQUIRES OT FOLLOW-UP: Yes  Activity Tolerance  Activity Tolerance: Treatment limited secondary to medical complications (free text) (secondary to diplopia and c/o dizziness)        Plan   Occupational Therapy Plan  Times Per Week: 5-7  Current Treatment Recommendations: Balance training, Functional mobility training, Endurance training, Strengthening, Self-Care / ADL, Equipment evaluation, education, & procurement     Restrictions  Position Activity Restriction  Other position/activity restrictions: Up as tolerated    Subjective   General  Chart Reviewed: Yes  Additional Pertinent Hx: Pt admitted 11/30/22 with complaints of dizziness and double vision and a left-sided facial droop. CT Head=No acute intracranial hemorrhage or mass effect. MRI Brain= Small acute infarct in the medial right thalamus. 2. Mild periventricular white matter changes consistent with chronic small vessel ischemic disease. Family / Caregiver Present: Yes (Daughter)  Referring Practitioner: Dr. Mannie Diamond  Diagnosis: Diplopia  Subjective  Subjective: Pt in bed upon entry. c/o double vision. c/o headache.  (nurse aware)     Social/Functional History  Social/Functional History  Lives With: Spouse  Type of Home: House  Home Layout: Two level, Bed/Bath upstairs, 1/2 bath on main level, Laundry in basement  Home Access: Stairs to enter without rails  Entrance Stairs - Number of Steps: 2  Bathroom Shower/Tub: Walk-in shower  Bathroom Toilet: Standard  Bathroom Equipment: Hand-held shower  Home Equipment:  (None)  Has the patient had two or more falls in the past year or any fall with injury in the past year?: No  ADL Assistance: [address] [place] [address] Avenue: Independent  Ambulation Assistance: Independent  Transfer Assistance: Independent  Active : Yes  Occupation: Volunteer work  Type of Occupation: [employer]  Additional Comments: Son is currently staying with pt after recent back surgery.  travels for work. Daughter currently here from out of town       Objective         Safety Devices  Type of Devices: Left in chair;Call light within reach; Chair alarm in place;Nurse notified  Restraints  Restraints Initially in Place: No     Toilet Transfers  Toilet - Technique: Ambulating  Equipment Used: Standard toilet (grab bar)  Toilet Transfer: Contact guard assistance  AROM: Within functional limits  Strength: Within functional limits (Rt 5/5, Lft 4-/5)  Coordination: Within functional limits  Tone: Normal  ADL  Grooming: Contact guard assistance (to wash hands, standing at sink)  LE Dressing: Contact guard assistance  Toileting:  (Denied need)        Bed mobility  Supine to Sit: Stand by assistance  Scooting: Stand by assistance  Transfers  Stand Step Transfers: Contact guard assistance  Sit to stand: Contact guard assistance  Stand to sit: Contact guard assistance  Vision - Basic Assessment  Prior Vision: Wears glasses all the time  Patient Visual Report: Diplopia  Vision  Vision: Impaired (currently c/o double vision)  Vision Exceptions: Wears glasses at all times  Hearing  Hearing: Within functional limits  Cognition  Overall Cognitive Status: WFL  Orientation  Overall Orientation Status: Within Functional Limits                  Education Given To: Patient  Education Provided: Role of Therapy;Plan of Care;Transfer Training  Education Method: Verbal  Barriers to Learning: None  Education Outcome: Verbalized understanding           Hand Dominance  Hand Dominance: Right        Treatment included ADL and transfer training.       AM-PAC Score        AM-PAC Inpatient Daily Activity Raw Score: 18 (12/01/22 1238)  AM-PAC Inpatient ADL T-Scale Score : 38.66 (12/01/22 1238)  ADL Inpatient CMS 0-100% Score: 46.65 (12/01/22 1238)  ADL Inpatient CMS G-Code Modifier : CK (12/01/22 1238)    Goals                           No goals met  Short Term Goals  Time Frame for Short Term Goals: Discharge  Short Term Goal 1: Transfer to/from toilet with supervision  Short Term Goal 2: Stance with supervision x6 min while engaging in ADL/functional mobility  Short Term Goal 3: Lower body dressing with supervision  Patient Goals   Patient goals : Go home. Be independent.        Therapy Time   Individual Concurrent Group Co-treatment   Time In 1013         Time Out 1051         Minutes 38         Timed Code Treatment Minutes: 28 Minutes   Total Treatment [address], OTR/L [id number]

## 2022-12-01 NOTE — PLAN OF CARE
Problem: Safety - Adult  Goal: Free from fall injury  12/1/2022 1253 by Fran Varela RN  Outcome: Completed  12/1/2022 1219 by Fran Varela RN  Outcome: Progressing  12/1/2022 1218 by Fran Varela RN  Outcome: Not Progressing  11/30/2022 2353 by Simi Cornelius RN  Outcome: Progressing     Problem: Discharge Planning  Goal: Discharge to home or other facility with appropriate resources  12/1/2022 1253 by Fran Varela RN  Outcome: Completed  12/1/2022 1219 by Fran Varela RN  Outcome: Progressing  12/1/2022 1218 by Fran Varela RN  Outcome: Not Progressing  Flowsheets (Taken 12/1/2022 0800)  Discharge to home or other facility with appropriate resources:   Identify barriers to discharge with patient and caregiver   Arrange for needed discharge resources and transportation as appropriate   Identify discharge learning needs (meds, wound care, etc)   Refer to discharge planning if patient needs post-hospital services based on physician order or complex needs related to functional status, cognitive ability or social support system  11/30/2022 2353 by Simi Cornelius RN  Outcome: Progressing  Flowsheets (Taken 11/30/2022 2217)  Discharge to home or other facility with appropriate resources: Identify barriers to discharge with patient and caregiver     Problem: Neurosensory - Adult  Goal: Achieves stable or improved neurological status  12/1/2022 1253 by Fran Varela RN  Outcome: Completed  12/1/2022 1219 by Fran Varela RN  Outcome: Progressing  Goal: Absence of seizures  12/1/2022 1253 by Fran Varela RN  Outcome: Completed  12/1/2022 1219 by Fran Varela RN  Outcome: Progressing  Goal: Remains free of injury related to seizures activity  12/1/2022 1253 by Fran Varela RN  Outcome: Completed  12/1/2022 1219 by Fran Varela RN  Outcome: Progressing  Goal: Achieves maximal functionality and self care  12/1/2022 1253 by Fran Varela RN  Outcome: Completed  12/1/2022 1219 by Fran Varela RN  Outcome: Progressing     Problem: Cardiovascular - Adult  Goal: Maintains optimal cardiac output and hemodynamic stability  12/1/2022 1253 by Rowena Gomez RN  Outcome: Completed  12/1/2022 1219 by Rowena Gomez RN  Outcome: Progressing  Goal: Absence of cardiac dysrhythmias or at baseline  12/1/2022 1253 by Rowena Gomez RN  Outcome: Completed  12/1/2022 1219 by Rowena Gomez RN  Outcome: Progressing     Problem: Musculoskeletal - Adult  Goal: Return mobility to safest level of function  12/1/2022 1253 by Rowena Gomez RN  Outcome: Completed  12/1/2022 1219 by Rowena Gomez RN  Outcome: Progressing  Goal: Maintain proper alignment of affected body part  12/1/2022 1253 by Rowena Gomez RN  Outcome: Completed  12/1/2022 1219 by Rowena Gomez RN  Outcome: Progressing  Goal: Return ADL status to a safe level of function  12/1/2022 1253 by Rowena Gomez RN  Outcome: Completed  12/1/2022 1219 by Rowena Gomez RN  Outcome: Progressing     Problem: Genitourinary - Adult  Goal: Absence of urinary retention  12/1/2022 1253 by Rowena Gomez RN  Outcome: Completed  12/1/2022 1219 by Rowena Gomez RN  Outcome: Progressing  Goal: Urinary catheter remains patent  12/1/2022 1253 by Rowena Gomez RN  Outcome: Completed  12/1/2022 1219 by Rowena Gomez RN  Outcome: Progressing     Problem: Pain  Goal: Verbalizes/displays adequate comfort level or baseline comfort level  Outcome: Completed     Problem: Safety - Adult  Goal: Free from fall injury  12/1/2022 1253 by Rowena Gomez RN  Outcome: Completed  12/1/2022 1219 by Rowena Gomez RN  Outcome: Progressing  12/1/2022 1218 by Rowena Gomez RN  Outcome: Not Progressing  11/30/2022 2353 by Adithya Mejia RN  Outcome: Progressing     Problem: Discharge Planning  Goal: Discharge to home or other facility with appropriate resources  12/1/2022 1253 by Rowena Gomez RN  Outcome: Completed  12/1/2022 1219 by Rowena Gomez RN  Outcome: Progressing  12/1/2022 1218 by Rowena Gomez RN  Outcome: Not Progressing  Flowsheets (Taken 12/1/2022 0800)  Discharge to home or other facility with appropriate resources:   Identify barriers to discharge with patient and caregiver   Arrange for needed discharge resources and transportation as appropriate   Identify discharge learning needs (meds, wound care, etc)   Refer to discharge planning if patient needs post-hospital services based on physician order or complex needs related to functional status, cognitive ability or social support system  11/30/2022 2353 by Adithya Mejia RN  Outcome: Progressing  Flowsheets (Taken 11/30/2022 2217)  Discharge to home or other facility with appropriate resources: Identify barriers to discharge with patient and caregiver

## 2022-12-01 NOTE — ED NOTES
Pt out of the room at [address] [facility], [address] [place] [address]  11/30/22 2045

## 2022-12-01 NOTE — DISCHARGE SUMMARY
Hospital Medicine Discharge Summary    Patient ID: Jose F Prescott      Patient's PCP: No primary care provider on file. Admit Date: 11/30/2022     Discharge Date:   12/1/2022    Admitting Physician: Jazzmine Fairchild MD     Discharge Physician: Jazzmine Fairchild MD     Discharge Diagnoses: Active Hospital Problems    Diagnosis Date Noted    Diplopia [H53.2] 11/30/2022     Priority: Medium       The patient was seen and examined on day of discharge and this discharge summary is in conjunction with any daily progress note from day of discharge. Hospital Course:   78 y.o. female Jose F Prescott  -History of suburethral sling procedure, urgent continence, osteoarthritis  -She could not sleep last night, she states she was just looking in the mirror overnight from 2:30 AM to 5 AM, she probably slept around that time, 7:30 AM woke up with dizziness and double vision. She had a blood test and was able to go to sleep, when she woke up again at 10 AM she still had dizziness, some noted facial droop on the left, she was brought to the emergency room for further evaluation. In the emergency room she is noted to be hypertensive to 135/114, labs unremarkable, with no other lateralizing symptoms. CT head did not show acute intracranial hemorrhage or mass-effect. CTA showed mild atherosclerosis no large vessel occlusion. With persistent dizziness and diplopia patient admitted to hospital for further evaluation with MRI, neurology consult and frequent neurochecks.      Seen by neurology -   - Much more likely due to vascular disease and therefore mainstay of treatment going forward is tight control of vascular risk factors  - In her case, her BP is elevated overall and do not feel that outside of initial reading in ER, her BP values are falsely elevated due to autoregulation post-stroke  - Likewise, her LDL (and total cholesterol) have been elevated in the past (April 2022 tot chol 269 & ) and she has not been on a statin     Recommendations  - Continue home dose ASA 81mg and will add Plavix 75mg x21 days given this can be added close to stroke onset and NIHSS score is low  - Agree with addition of high-intensity statin  - Needs tight control of BP with goal SBP < 140 AND DBP < 90 at all times; no need to allow any BP autoregulation at this point  - Impressed upon her the importance of closely monitoring BP at home on a daily basis and reviewing these values with her PCP regularly  - Additionally, needs to keep close f/u with PCP in order to ensure improvement in cholesterol and LDL values as well as monitoring glucose closely for any sign of development of DM so that appropriate treatment can be started, if indicated  - Follow-up with Neurology  - Follow-up with Ophthalmology      Physical Exam Performed:     BP (!) 154/78   Pulse 74   Temp 98.2 °F (36.8 °C) (Oral)   Resp 18   Ht 5' 8\" (1.727 m)   Wt 243 lb 13.3 oz (110.6 kg)   SpO2 98%   BMI 37.07 kg/m²     -Mental status: A&O x3  -Memory: Recent & remote memory intact  -Attention: Normal  -Fund of Knowledge: Good  -Speech & Language: no aphasia; no dysarthria  -Cranial nerves: pupils 4mm->3mm bilaterally; fields intact; unable to visualize fundi; some dysconjugate gaze, romaine with upward eye movement; sensation intact V1, V2, V3; no facial asymmetry; hearing intact bilaterally; palate elevates symmetrically; SCMs & trapezii intact bilaterally; tongue midline  -Sensory: intact to lt touch throughout  -Motor:   RUE: 5/5, no pronator drift  RLE: 5/5  LUE: 5/5 at shoulder and elbow but 4+/5  (very mildly weak), no pronator drift  LLE: 5/5  -Tone: Normal throughout  -Reflexes: 1+ & symmetric throughout  -Coordination: FNF intact  -Gait & Station: deferred for pt safety  -Other: no adventitious movements noted  Other Systems  -General Appearance: well-developed, well-nourished, no apparent distress  -Neck: supple  -Lungs: breathing unlabored, regular, no audible wheezes  -CV: pulses strong x4 extremities  -Abd: flat  -Skin: warm to touch, no wounds  -Psych: pleasant and appropriate  -Extrem: no c/c/e    Labs: For convenience and continuity at follow-up the following most recent labs are provided:      CBC:    Lab Results   Component Value Date/Time    WBC 7.5 12/01/2022 05:38 AM    HGB 14.8 12/01/2022 05:38 AM    HCT 43.9 12/01/2022 05:38 AM     12/01/2022 05:38 AM       Renal:    Lab Results   Component Value Date/Time     11/30/2022 01:12 PM    K 4.2 11/30/2022 01:12 PM     11/30/2022 01:12 PM    CO2 26 11/30/2022 01:12 PM    BUN 14 11/30/2022 01:12 PM    CREATININE 0.6 11/30/2022 01:12 PM    CALCIUM 9.6 11/30/2022 01:12 PM         Significant Diagnostic Studies    Radiology:   MRI brain without contrast   Final Result      1. Small acute infarct in the medial right thalamus. 2.  Mild periventricular white matter changes consistent with chronic small vessel ischemic disease. CTA HEAD NECK W CONTRAST   Final Result      Mild atherosclerosis. No steno-occlusive disease. CT HEAD WO CONTRAST   Final Result      No acute intracranial hemorrhage or mass effect.       Result communicated to Dr. David Rivera 12:34 PM on 11/30/22             Consults:     IP CONSULT TO HOSPITALIST  IP CONSULT TO PHARMACY  IP CONSULT TO NEUROLOGY    Disposition:  Home     Condition at Discharge: Stable    Discharge Instructions/Follow-up:    Monitor blood pressure 2-3 times per day, log reading and discuss with primary care physician  Plavix use for 21 days with aspirin baby dose and then aspirin only  Follow up with primary care physician in 7-10 days  Follow up with physical and occupational therapy    Code Status:  Full Code    Activity: activity as tolerated    Diet: regular diet      Discharge Medications:     Current Discharge Medication List             Details   amLODIPine (NORVASC) 5 MG tablet Take 1 tablet by mouth daily  Qty: 30 tablet, Refills: 3 clopidogrel (PLAVIX) 75 MG tablet Take 1 tablet by mouth daily for 21 doses  Qty: 21 tablet, Refills: 0      rosuvastatin (CRESTOR) 40 MG tablet Take 1 tablet by mouth nightly  Qty: 30 tablet, Refills: 3                Details   aspirin 81 MG EC tablet Take 81 mg by mouth daily      Multiple Vitamins-Minerals (THERAPEUTIC MULTIVITAMIN-MINERALS) tablet Take 1 tablet by mouth daily      b complex vitamins capsule Take 1 capsule by mouth daily      vitamin D (CHOLECALCIFEROL) 25 MCG (1000 UT) TABS tablet Take 1,000 Units by mouth daily      Omega-3 Fatty Acids (FISH OIL) 1000 MG capsule Take 1,000 mg by mouth daily             Time Spent on discharge is more than 30 minutes in the examination, evaluation, counseling and review of medications and discharge plan. Signed:    Joanna Davies MD   12/1/2022      Thank you No primary care provider on file. for the opportunity to be involved in this patient's care. If you have any questions or concerns please feel free to contact me at [phone].

## 2022-12-01 NOTE — PROGRESS NOTES
Patient assisted to restroom. It was noted that she was leaning to the left and off balance favoring her left side. She C/O diplopia while ambulating which is not a change from her previous complaints. See Stroke scale.

## 2022-12-01 NOTE — PLAN OF CARE
Problem: Safety - Adult  Goal: Free from fall injury  12/1/2022 1219 by Luan Orellana RN  Outcome: Progressing  12/1/2022 1218 by Luan Orellana RN  Outcome: Not Progressing  11/30/2022 2353 by Mara Busby RN  Outcome: Progressing     Problem: Discharge Planning  Goal: Discharge to home or other facility with appropriate resources  12/1/2022 1219 by Luan Orellana RN  Outcome: Progressing  12/1/2022 1218 by Luan Orellana RN  Outcome: Not Progressing  Flowsheets (Taken 12/1/2022 0800)  Discharge to home or other facility with appropriate resources:   Identify barriers to discharge with patient and caregiver   Arrange for needed discharge resources and transportation as appropriate   Identify discharge learning needs (meds, wound care, etc)   Refer to discharge planning if patient needs post-hospital services based on physician order or complex needs related to functional status, cognitive ability or social support system  11/30/2022 2353 by Mara Busby RN  Outcome: Progressing  Flowsheets (Taken 11/30/2022 2217)  Discharge to home or other facility with appropriate resources: Identify barriers to discharge with patient and caregiver     Problem: Neurosensory - Adult  Goal: Achieves stable or improved neurological status  Outcome: Progressing  Goal: Absence of seizures  Outcome: Progressing  Goal: Remains free of injury related to seizures activity  Outcome: Progressing  Goal: Achieves maximal functionality and self care  Outcome: Progressing     Problem: Cardiovascular - Adult  Goal: Maintains optimal cardiac output and hemodynamic stability  Outcome: Progressing  Goal: Absence of cardiac dysrhythmias or at baseline  Outcome: Progressing     Problem: Musculoskeletal - Adult  Goal: Return mobility to safest level of function  Outcome: Progressing  Goal: Maintain proper alignment of affected body part  Outcome: Progressing  Goal: Return ADL status to a safe level of function  Outcome: Progressing     Problem: Genitourinary - Adult  Goal: Absence of urinary retention  Outcome: Progressing  Goal: Urinary catheter remains patent  Outcome: Progressing     Problem: Safety - Adult  Goal: Free from fall injury  12/1/2022 1219 by Rosa Isela Del Castillo RN  Outcome: Progressing  12/1/2022 1218 by Rosa Isela Del Castillo RN  Outcome: Not Progressing  11/30/2022 2353 by Ashley García RN  Outcome: Progressing     Problem: Discharge Planning  Goal: Discharge to home or other facility with appropriate resources  12/1/2022 1219 by Rosa Isela Del Castillo RN  Outcome: Progressing  12/1/2022 1218 by Rosa Isela Del Castillo RN  Outcome: Not Progressing  Flowsheets (Taken 12/1/2022 0800)  Discharge to home or other facility with appropriate resources:   Identify barriers to discharge with patient and caregiver   Arrange for needed discharge resources and transportation as appropriate   Identify discharge learning needs (meds, wound care, etc)   Refer to discharge planning if patient needs post-hospital services based on physician order or complex needs related to functional status, cognitive ability or social support system  11/30/2022 2353 by Ashley García RN  Outcome: Progressing  Flowsheets (Taken 11/30/2022 2217)  Discharge to home or other facility with appropriate resources: Identify barriers to discharge with patient and caregiver

## 2022-12-01 NOTE — PROGRESS NOTES
Speech Language Pathology  Facility/Department: [place] [address] [facility]   CLINICAL BEDSIDE SWALLOW EVALUATION  Speech, language, cognitive screen   Discharge     NAME: Dixie Smith  : 1955  MRN: [id number]    ADMISSION DATE: 2022  ADMITTING DIAGNOSIS: has Diplopia on their problem list.  ONSET DATE: 22    Recent Chest Xray   Not performed this visit    CT of head 22     No acute intracranial hemorrhage or mass effect. MRI brain 22  1. Small acute infarct in the medial right thalamus. 2.  Mild periventricular white matter changes consistent with chronic small vessel ischemic disease. Date of Eval: 2022  Evaluating Therapist: MYNOR Torres    Current Diet level:  Current Diet : Regular      Primary Complaint  Patient Complaint: pt c/o headache and double vision    Pain:  C/o pain   Pain Level: 3  Pain Location: Head  RN aware     Reason for Referral  Dixie Smith was referred for a bedside swallow evaluation to assess the efficiency of her swallow function, identify signs and symptoms of aspiration and make recommendations regarding safe dietary consistencies, effective compensatory strategies, and safe eating environment. History of Present Illness      Dixie Smith is a 78 y.o. female who presents to the emergency department with complaints of dizziness and double vision and a left-sided facial droop. Patient states that she had difficulty sleeping during the early morning hours this morning, and was awake from approximately 2:30 AM till 5 AM, tossing and turning and having difficulty sleeping, but otherwise felt normal at that time, with no double vision, dizziness, or complaints of weakness. She describes that she woke with her alarm at 730 this morning, and at that time felt dizzy and unsteady when she attempted to get up out of bed to go to the bathroom. She was ultimately able to get up, had some breakfast and saw her son.   He recalls noting that she seemed unsteady and was complaining of dizziness at that time, but does not recall noticing any facial droop. She went back to lay down in bed until about 10 AM, when she got up again this time her symptoms were not improved and her son at this point noticed a left-sided facial droop and some slurring of speech. For this reason she was brought to the emergency department for evaluation. Patient states that she has been feeling well until she woke up at 730 this morning. She denies any present or antecedent chest pain, shortness of breath, palpitations. She does note a mild generalized headache. She does note some mild nausea, but has had no vomiting. She states that at one point she felt as though her left arm was heavy, at a different point she felt as though her right arm was heavy and she had difficulty drinking her coffee. She states that her legs have felt heavy, but cannot notice any lateralization to this. Impression  Pt was alert and oriented x3. Speech is clear with no instances of word finding difficulty. Pt able to follow commands and respond to questions appropriately. Pt able to provide solutions to routine problems and recall 3 words with a 3 minute delay. Pt with very subtle left labial droop. All other oral structures WFL. .  Pt had no difficulty closing lips around spoon or drawing liquid up a straw. Pt had no difficulty with mastication with solids. There was no anterior spillage or oral residue noted with any consistency. Pt demonstrated no s/s aspiration with any consistency presented. Pt able to initiate swallow without difficulty with laryngeal movement observed. Vocal quality remained clear throughout. No throat clearing or choking observed with any consistency. Pt with intermittent coughing during assessment but did not appear to be the result of aspiration. Pt reported frequent coughing is her baseline due to ongoing lung issues.  Pt consumed 3oz water uninterrupted by cup without difficulty or s/s aspiration. Dysphagia Diagnosis: No clinical indicators of dysphagia;Swallow function appears [facility]  Dysphagia Outcome Severity Scale: Level 7: Normal in all situations     Treatment Plan  Requires SLP Intervention: No     D/C Recommendations: No follow up therapy recommended post discharge     Recommended Diet and Intervention    Diet recommendation - Regular with thin liquids-Make NPO if s/s aspiration emerge and alert SL     Recommended Form of Meds: PO     Compensatory Swallowing Strategies  Compensatory Swallowing Strategies : Upright as possible for all oral intake    Treatment/Goals   N/A    General  Chart Reviewed: Yes  Behavior/Cognition: Alert; Cooperative;Pleasant mood  Respiratory Status: Room air  Communication Observation: Functional  Follows Directions: Complex  Dentition: Adequate  Patient Positioning: Upright in bed  Baseline Vocal Quality: Normal  Volitional Cough: Strong  Prior Dysphagia History: pt reports no history of dysphagia      Vision/Hearing  Vision  Vision: Impaired (pt c/o double vision)  Hearing  Hearing: Within functional limits        Prognosis  Individuals consulted  Consulted and agree with results and recommendations: Patient;RN  RN Name: Yoselin eSgundo    Education  Patient Education: Role of SLP, anatomy and physiology of the swallow, swallowing strategies, plan to discharge from Speech Therapy and was encouraged to alert staff if difficulty with communication or swallowing emerge  Patient Education Response: Verbalizes understanding and agreement  Safety Devices in place: Yes  Type of devices: Call light within reach       Therapy Time  SLP Individual Minutes  Time In: 0800  Time Out: 0822  Minutes: 22          Pt's goal: pt denied difficulty with swallowing  and communication so did not state goals      Plan:  Communication, cognition and swallowing appeared to be [facility].  Pt discharged from Speech Therapy  services   Recommended diet:Regular with thin liquids-Make NPO if s/s aspiration emerge and alert SLP  Total treatment time:22  Pt's discharge plan:to home   Discharge Plan: No further follow up indicated unless there is a change in status then please re-refer  Discussed with RN, Ishmael Zamora   Needs within reach.        Sheldon Ricketts, [facility]- [address] [facility]  Pg # [phone]  This document will serve as a discharge summary if pt discharge before next treatment   session

## 2022-12-01 NOTE — DISCHARGE INSTRUCTIONS
Monitor blood pressure 2-3 times per day, log reading and discuss with primary care physician  Plavix use for 21 days with aspirin baby dose and then aspirin only  Follow up with primary care physician in 7-10 days  Follow up with physical and occupational therapy

## 2022-12-01 NOTE — CARE COORDINATION
Case Management Assessment  Initial Evaluation    Date/Time of Evaluation: 12/1/2022 1:50 PM  Assessment Completed by: Dodie Byrne RN    If patient is discharged prior to next notation, then this note serves as note for discharge by case management. Patient Name: Jarek Gaines                   YOB: 1955  Diagnosis: Diplopia [H53.2]  Vertigo [R42]  Facial droop [R29.810]                   Date / Time: 11/30/2022 11:43 AM    Patient Admission Status: Inpatient   Readmission Risk (Low < 19, Mod (19-27), High > 27): Readmission Risk Score: 4.7    Current PCP: JEF Rizzo CNP  PCP verified by CM? yes    Chart Reviewed: Yes      History Provided by:  pt  Patient Orientation:    axox4  Patient Cognition:      Hospitalization in the last 30 days (Readmission):  No    If yes, Readmission Assessment in CM Navigator will be completed. Advance Directives:      Code Status: Full Code   Patient's Primary Decision Maker is:        Discharge Planning:    Patient lives with: Spouse/Significant Other Type of Home: House  Primary Care Giver:    Patient Support Systems include: Spouse/Significant Other, Children, Family Members   Current Financial resources:    Current community resources:    Current services prior to admission: None            Current DME:              Type of Home Care services:  None    ADLS  Prior functional level:    Current functional level:      PT AM-PAC: 19 /24  OT AM-PAC: 18 /24    Family can provide assistance at DC:  yes  Would you like Case Management to discuss the discharge plan with any other family members/significant others, and if so, who?     Plans to Return to Present Housing:    Other Identified Issues/Barriers to RETURNING to current housing: none  Potential Assistance needed at discharge: N/A            Potential DME:  rolling walker  Patient expects to discharge to: [address] [place] [address] for transportation at discharge:  family    Financial    Payor: [address] / Plan: MEDICARE PART A AND B / Product Type: *No Product type* /     Does insurance require precert for SNF: No    Potential assistance Purchasing Medications: No  Meds-to-Beds request:        [id number] [place] [facility] [address] - P [phone] - F [address] [place] [address]  Phone: [phone] Fax: [phone]      Notes:    Factors facilitating achievement of predicted outcomes: Family support, Motivated, Cooperative, and Pleasant    Barriers to discharge: Medical complications    Additional Case Management Notes: CM met with pt at bedside. From home independent, . Anticipates no needs for dc. PT recommended a walker and pt would like one for dc. [facility] and they will deliver walker to bedside. The Plan for Transition of Care is related to the following treatment goals of Diplopia [H53.2]  Vertigo [R42]  Facial droop [I16.925]    IF APPLICABLE: The Patient and/or patient representative Erika Titus and her family were provided with a choice of provider and agrees with the discharge plan.  Freedom of choice list with basic dialogue that supports the patient's individualized plan of care/goals and shares the quality data associated with the providers was provided to:     Patient Representative Name:       The Patient and/or Patient Representative Agree with the Discharge Plan? yes     Dodie Byrne RN  Case Management Department  Ph: [phone]

## 2022-12-19 ENCOUNTER — HOSPITAL ENCOUNTER (OUTPATIENT)
Dept: PHYSICAL THERAPY | Age: 67
Setting detail: THERAPIES SERIES
Discharge: HOME | End: 2022-12-19
Payer: MEDICARE

## 2022-12-19 PROCEDURE — 97116 GAIT TRAINING THERAPY: CPT

## 2022-12-19 PROCEDURE — 97110 THERAPEUTIC EXERCISES: CPT

## 2022-12-19 PROCEDURE — 97163 PT EVAL HIGH COMPLEX 45 MIN: CPT

## 2022-12-19 PROCEDURE — 97112 NEUROMUSCULAR REEDUCATION: CPT

## 2022-12-19 PROCEDURE — 97530 THERAPEUTIC ACTIVITIES: CPT

## 2022-12-19 NOTE — FLOWSHEET NOTE
[facility]  [address] [place] [address], [address] [place] [address]  Phone: [phone]   Fax: [phone]    Physical Therapy Treatment Note/ Progress Report:     Date:  2022    Patient Name:  Edenilson Ochoa    :  1955  MRN: [id number]    Medical/Treatment Diagnosis Information:  Diagnosis: H53.2 (ICD-10-CM) - Diplopia  I63.9 (ICD-10-CM) - Acute CVA (cerebrovascular accident)    R26 impaired gait and mobility due to dizziness, diplopia  Insurance/Certification information:  PT Insurance Information: Medicare  Physician Information:  Christen Mckeon MD   Plan of care signed:    [] Yes  [x] No    Date of Patient follow up with Physician:      Progress Report: []  Yes  [x]  No     Date Range for reporting period:  Beginnin22  Ending:      Progress report due (10 Rx/or 30 days whichever is less):      Recertification due (POC duration/ or 90 days whichever is less):      Visit # Insurance Allowable Auth Needed    BMN []Yes   [x]No     RESTRICTIONS/PRECAUTIONS: diplopia  Latex Allergy:  [x]NO      []YES  Preferred Language for Healthcare:   [x]English       []other:  Functional Scale: SIS-16: 69% ability; FGA 2030 ; 10m walk 0.90m/s  Date assessed:22    Pain level:  5/10 Headache  Dizziness: 5/10    SUBJECTIVE:  See eval    OBJECTIVE: Pt w/ increased time to complete paperwork, rest breaks required due to headache and dizziness, therefor eval limited. Observation: Pt amb w/ veering, L > R, stagger steps, limited trunk/head rotation, decreased rom. Test measurements:    CTSIB next      Exercises/Interventions: Exercises in bold performed in department today. Items not bolded are carried forward from prior visits for continuity of the record.     Exercise/Equipment Resistance/Repetitions HEP Other comments   Nustep  []    Marching  [] Standing, resisted as able    Cervical AROM  []    Head-eye coordination  []    Imaginary target []      []      []      []      []      []      []      []      []      []      []      []      []      []    TA:Pt educated on deficits, POC, progression and will address HEP next session. Home Exercise Program:  TBD    Therapeutic Exercise:   [] (34508) Provided verbal/tactile cueing for activities related to strengthening, flexibility, endurance, ROM for improvements in LE, proximal hip, and core control with self-care, mobility, lifting, ambulation. NMR:  [x] (33016) Provided verbal/tactile cueing for activities related to improving balance, coordination, kinesthetic sense, posture, motor skill, proprioception to assist with LE, proximal hip, and core control in self-care, mobility, lifting, ambulation and eccentric single leg control. Therapeutic Activities:    [x] (33766) Provided verbal/tactile cueing for activities related to improving balance, coordination, kinesthetic sense, posture, motor skill, proprioception and motor activation to allow for proper function of core, proximal hip and LE with self-care and ADLs and functional mobility. Gait Training:    [x] (86406) Provided training and instruction to the patient for proper LE, core and proximal hip recruitment and positioning and eccentric body weight control with ambulation re-education including up and down stairs     Manual Treatments:  PROM / STM / Oscillations-Mobs:  G-I, II, III, IV (PA's, Inf., Post.)  [] (28228) Provided manual therapy to mobilize LE, proximal hip and/or LS spine soft tissue/joints for the purpose of modulating pain, promoting relaxation,  increasing ROM, reducing/eliminating soft tissue swelling/inflammation/restriction, improving soft tissue extensibility and allowing for proper ROM for normal function with self-care, mobility, lifting and ambulation.      Home Exercise Program:    [] (06572) Reviewed/Progressed HEP activities related to strengthening, flexibility, endurance, ROM of core, proximal hip and LE for functional self-care, mobility, lifting and ambulation/stair navigation   [] (49397) Reviewed/Progressed HEP activities related to improving balance, coordination, kinesthetic sense, posture, motor skill, proprioception of core, proximal hip and LE for self-care, mobility, lifting, and ambulation/stair navigation      Modalities:    [] Electric Stimulation:   [] Ultrasound:   [] Other:       Charges:  Timed Code Treatment Minutes: TA 15;GT 14; NM26   Total Treatment Minutes: 55      [] EVAL (LOW) 78249 (typically 20 minutes face-to-face)  [] EVAL (MOD) 68556 (typically 30 minutes face-to-face)  [x] EVAL (HIGH) 48038 (typically 45 minutes face-to-face)  [] RE-EVAL     [] DH(23137) x       [x] NMR (26891) x   2    [] Manual (33911) x       [x] TA (01559) x   1   [x] Gait Training [phone] x   1    [] ES(attended) (43847)  [] ES (un) (53176)   [] DRY NEEDLE 1 OR 2 MUSCLES  [] DRY NEEDLE 3+ MUSCLES  [] Mech Traction (29060)  [] Ultrasound (28672)  [] Other:    GOALS: Goals established 12/19/22   Patient stated goal: to get rid of dizziness and walk independently  [] Progressing: [] Met: [] Not Met: [] Adjusted    Therapist goals for Patient:   Short Term Goals: To be achieved in: 3 weeks   1. Independent in HEP and progression per patient tolerance. [] Progressing: [] Met: [] Not Met: [] Adjusted   2. Pt will report dizziness and headache <3/10 to improve tolerance of functional mobility and tasks. [] Progressing: [] Met: [] Not Met: [] Adjusted  3. Pt will amb w/ SPC indoors mod I and outdoors supervision. [] Progressing: [] Met: [] Not Met: [] Adjusted  4. Pt will improve FGA to 23/30 to demonstrate reduced all risk. [] Progressing: [] Met: [] Not Met: [] Adjusted    Long Term Goals: To be achieved in: 6 weeks  1. Pt will improve SIS-16 score to 85% ability. [] Progressing: [] Met: [] Not Met: [] Adjusted  2. Pt will improve 10m walk speed to >=1.0m/s. [] Progressing: [] Met: [] Not Met: [] Adjusted  3.  Pt will report dizziness/headache <1/10 to improve tolerance of functional mobility and tasks. [] Progressing: [] Met: [] Not Met: [] Adjusted  4. Pt will improve FGA to 27/30 to demonstrate reduced all risk. [] Progressing: [] Met: [] Not Met: [] Adjusted  5. Pt independent with HEP. [] Progressing: [] Met: [] Not Met: [] Adjusted  6. Pt to report improved confidence with ambulating in community w/ AD as needed. [] Progressing: [] Met: [] Not Met: [] Adjusted    ASSESSMENT:  See eval      Treatment/Activity Tolerance:  [x] Patient tolerated treatment well [] Patient limited by fatique  [] Patient limited by pain  [] Patient limited by other medical complications  [] Other:     Overall Progression Towards Functional goals/ Treatment Progress Update:  [] Patient is progressing as expected towards functional goals listed. [] Progression is slowed due to complexities/Impairments listed. [] Progression has been slowed due to co-morbidities. [x] Plan just implemented, too soon to assess goals progression <30days   [] Goals require adjustment due to lack of progress  [] Patient is not progressing as expected and requires additional follow up with physician  [] Other    Prognosis for POC: [x] Good [] Fair  [] Poor    Patient requires continued skilled intervention: [x] Yes  [] No        PLAN: 2/week for 6 weeks  [] Continue per plan of care [] Alter current plan (see comments)  [x] Plan of care initiated [] Hold pending MD visit [] Discharge    Electronically signed by: Atanacio Mortimer, PT, DPT    Note: If patient does not return for scheduled/recommended follow up visits, this note will serve as a discharge from care along with the most recent update on progress.

## 2022-12-19 NOTE — PLAN OF CARE
[facility]  [address] [place] [address], [address], [address]  Phone: [phone]   Fax: [phone]       Physical Therapy Certification    Dear  Dr. Brandi Livingston,    We had the pleasure of evaluating the following patient for physical therapy services at [place] [address]. A summary of our findings can be found in the initial assessment below. This includes our plan of care. If you have any questions or concerns regarding these findings, please do not hesitate to contact me at the office phone number above. Thank you for the referral.       Provider Signature:_______________________________Date:__________________  By signing above (or electronic signature), therapist's plan is approved by physician      Patient:  Tessa Silva   : 1955   MRN: [id number]    Referring Physician:  Dr. Brandi Livingston        Evaluation Date: 2022        Medical Diagnosis Information:  Diagnosis: H53.2 (ICD-10-CM) - Diplopia  I63.9 (ICD-10-CM) - Acute CVA (cerebrovascular accident)                                           Insurance information: PT Insurance Information: Medicare   Treatment diagnosis: R26 impaired gait and mobility due to dizziness, diplopia     Precautions/ Contra-indications: diplopia    Adhesive allergy: NO  Latex Allergy:  NO    Preferred Language for Healthcare:   [x]English       []other:    C-SSRS Triggered by Intake questionnaire (Past 2 wk assessment):   [x] No, Questionnaire did not trigger screening.   [] Yes, Patient intake triggered further evaluation      [] C-SSRS Screening completed  [] PCP notified via Plan of Care  [] Emergency services notified    Plan of care sent to provider:      []Faxed   [x]Co-signature   (attempts: 1[x] 22 2 []3[])              Co-morbidities/Complexities (which will affect course of rehabilitation):   []None        []Hx of COVID   Arthritic conditions   []Rheumatoid arthritis (M05.9)  [x]Osteoarthritis (M19.91)  []Gout   Cardiovascular conditions   []Hypertension (I10)  [x]Hyperlipidemia (E78.5)  []Angina pectoris (I20)  []Atherosclerosis (I70)  []Pacemaker  []Hx of CABG/stent/  cardiac surgeries   Musculoskeletal conditions   []Disc pathology   []Congenital spine pathologies   []Osteoporosis (M81.8)  []Osteopenia (M85.8)  []Scoliosis       Endocrine conditions   []Hypothyroid (E03.9)  []Hyperthyroid Gastrointestinal conditions   []Constipation (K59.00)  [] Diarrhea   Metabolic conditions   []Morbid obesity (E66.01)  []Diabetes type 1(E10.65) or 2 (E11.65)   []Neuropathy (G60.9)     Cardio/Pulmonary conditions   []Asthma (J45)  []Coughing   []COPD (J44.9)  []CHF  []A-fib   Psychological Disorders  []Anxiety (F41.9)  []Depression (F32.9)   []Other:   Developmental Disorders  []Autism (F84.0)  []CP (G80)  []Down Syndrome (Q90.9)  []Developmental delay     Neurological conditions  []Prior Stroke (I69.30)  []Parkinson's (G20)  []Encephalopathy (G93.40)  []MS (G35)  []Post-polio (G14)  []SCI  []TBI  []ALS Other conditions  []Fibromyalgia (M79.7)  []Vertigo  []Syncope  []Kidney Failure  []Cancer      []currently undergoing                treatment  []Pregnancy  []Incontinence   Prior surgeries  []involved limb  []previous spinal surgery  [] section birth  []hysterectomy  []bowel / bladder surgery  []other relevant surgeries  suburethral sling procedure        Occupation/School: unemployed    Sport/recreational activities: travel, caregiver for family members    Patient goal for therapy:  \"to get rid of dizziness, walk independently \"      SUBJECTIVE  History of Present Illness:  Pt presented to [facility] ED on 22  with unsteady gait, left facial weakness, and dysarthria, found to have small right medial thalamic acute ischemic stroke but also a portion which is contiguous and involves upper portion of midbrain on the right as well.  Per neurology consult \"This would explain her left-sided symptoms and her mild dysconjugate gaze leading to diplopia. Stroke in this location is undoubtedly due to small vessel ischemic disease and is very unlikely to be embolic. Much more likely due to vascular disease and therefore mainstay of treatment going forward is tight control of vascular risk factors\"    Pt currently c/o significant dizziness and headache that is worsened w/ movement/activity/noise/riding in car. She has constant tinnitus now. She c/o fatigue, sleeping more than normal. She is very fearful of falling and was prior to CVA due to her mother sustaining a fall recently that \"changed her life\". Pt not able to tolerate cooking, only minimal cleaning like wiping off table and minimal laundry due to dizziness. She tries to get up and walk every hour. Pt denies weakness in arms or legs. She rates dizziness: 5/10 currently. She was previously not on any medication, but is now on BP, blood thinner and cholesterol meds. She saw an ophthalmologist at [facility] who recommended use of eye patch, alternating eyes evenly ( states she has a follow up w/ a neuro ophthalmologist in early January). Brain MRI 12/1/22: Final Result       1. Small acute infarct in the medial right thalamus. 2.  Mild periventricular white matter changes consistent with chronic small vessel ischemic disease. Social support/Environment:    Family/caregiver support:       YES-      Home environment:   two story home  Steps to enter first floor:    2 steps to enter   Steps to second floor:  Full flight of 12-13 and hand rail: unilateral on right  Bathroom:    Shower Chair - not using   Equipment owned:   Courtney Zhong (MANOHAR)- not using     Baseline function/PLOF:  History of falls     No  Pt able to drive     Yes  Pt independent with ADLs  Yes  Pt. Required assistance from family for:   Independent PTA    Pt. independent for transfers and gait and walked with No Device    Pain       Patient describes (soreness at L deltoid- possible flue shot sight??) 5     Shoulder IR       Shoulder ER       Elbow Flexion        Elbow Extension       Supination       Pronation       Wrist Flexion       Wrist Extension       Hand         Movement Left Right []AROM  []PROM   Hip Flexion 4 4     Hip Extension       Hip Abduction       Hip Adduction       Hip Internal Rotation       Hip External Rotation       Knee Flexion 5 5     Knee Extension 5 5     Ankle Dorsiflexion 5 5     Ankle Plantarflexion >=2/5 >2/5     Ankle Inversion       Ankle Eversion       Great Toe extension             Reflexes  Reflex Left Right   Biceps (C5,6)     Brachioradialis (C6)     Triceps (C7)     Quadriceps (L3,4)     Achilles (S1,2)     Ankle clonus Negative Negative   Keshia's reflex      Babinski's reflex        Coordination Testing:       Finger to Nose:        NT  Alternating pronation/supination:   NT  Finger/Thumb opposition:  NT  Heel to shin (sitting or supine):      NT  Alternating Toe Tapping:       Diminished- slightly     Flexibility     WFL       Functional Mobility    Transitional Movement Assistance Level  All slow and cautious due to dizziness   Rolling to left side Modified Independent   Rolling to right side Modified Independent   Scooting in bed Modified Independent   Supine to sit Modified Independent   Sit to supine Modified Independent   Sit to stand Modified Independent   Stand to sit Modified Independent   Bed to chair  Modified Independent with stepping/ambulating      Gait Testing:     Gait   Level of Assistance needed:     Supervision  Gait Deviations (firm surface/linoleum):     decreased head and trunk rotation, deviated path, staggers, neglect noted on left, and per pt report, none observed in clinic   Assistive Device Used:     no AD    Stairs  Level of Assistance needed:      Modified Independent  Pt able to negotiate up/down 4 stairs:   WNL w/ 0-1 handrail, reciprocal pattern, no falter  Assistive Device Used: no AD  Deficits noted:            Balance  Static Sitting:  Good   Dynamic Sitting: Good   Static Standing:  Fair + (increased very small amplitude sway)   Dynamic Standing: Poor    Falls Risk Assessment (30 days):   [] Falls Risk assessed and no intervention required. [x] Falls Risk assessed and Patient requires intervention due to being higher risk   []Tinetti Balance: Total Score:        Balance:            Gait:         Disability Index:       Risk of Falls:   []Low (> 24)   []Mod (19-23)   []High (< 18)    [x]10m walk speed    0.91m/s    [x]Functional Gait Assessment    FGA Score: 20/30      Indications: Non-specific older adults: <22/30 = risk of falls; Parkinson's patients <15-18/30 = risk of falls    []Chavira Balance Scale          []41-56 = independent     []21-40 = walking with assistance     []0-20 = wc bound    [x]Timed Up and Go (TUG)    9.6 seconds     [x]<10 = freely mobile     []<20 = mostly independent    []20-29  = variable mobility    []>20 = impaired mobility  [x] Falls education provided, including: pt requests and would benefit from use of SPC to improve confidence in balance and balance reactions. Pt and  educated on appropriate type of cane and means of adjusting to appropriate height. Pt trialed cane in both R and L hand, appears safer and with less deviations w/ cane in R hand today. Functional Scale/Score:  Testing:   YES  Measure Used:  SIS-16  Date Assessed:  12/19/2022  Score:    69% ability       [x] Patient history, allergies, meds reviewed. Medical chart reviewed. See intake form. Review Of Systems (ROS):  [x]Performed Review of systems (Integumentary, CardioPulmonary, Neurological) by intake and observation. Intake form has been scanned into medical record. Patient has been instructed to contact their primary care physician regarding ROS issues if not already being addressed at this time.         Barriers to/and or personal factors that will affect rehab potential:              Co-Morbidities  Environmental, home barriers  Past PT/medical experience  Justification:       ASSESSMENT: Pt is 79 yo Female presenting to OP PT clinic with medical diagnosis of Diagnosis: H53.2 (ICD-10-CM) - Diplopia  I63.9 (ICD-10-CM) - Acute CVA (cerebrovascular accident). Presents today with  double vision, dizziness, headache and sensitivity to motion, sound and lights causing impaired balance. Would benefit from continued OP PT to address below impairments. Functional Impairments:    Assistance required with functional mobility/gait for safety below baseline  Decreased core/proximal hip strength and neuromuscular control   Impaired balance/coordination/proprioceptive control     Functional Activity Limitations   Reduced ability to tolerate prolonged functional positions  Reduced ability or difficulty with changes of positions or transfers between positions  Reduced ability or tolerance with driving and/or computer work  Reduced ability to perform lifting, carrying tasks  Reduced ability to squat  Reduced ability to forward bend  Reduced ability to ambulate prolonged functional periods/distances/surfaces  Reduced ability to ascend/descend stairs  Reduced ability to self-correct for losses of balance  Reduced ability to transfer in/out of bed or rolling in bed     Participation Restrictions  Reduced participation in home management activities  Reduced participation in social activities. Reduced participation in sport activities.     Classification :   Signs/symptoms consistent with primary prevention/risk reduction for loss of balance and falls   Signs/symptoms consistent with Impaired motor function and sensory integrity associated w/non-progressive disorders of CNS - Congenital/aquired in infancy/childhood or acquired in adolescence/adulthood     Prognosis/Rehab Potential:    Good    Tolerance of evaluation/treatment:   Fair     Physical Therapy Evaluation Complexity Justification [x] A history of present problem with:  [] no personal factors and/or comorbidities that impact the plan of care;  []1-2 personal factors and/or comorbidities that impact the plan of care  [x]3 personal factors and/or comorbidities that impact the plan of care  [x] An examination of body systems using standardized tests and measures addressing any of the following: body structures and functions (impairments), activity limitations, and/or participation restrictions;:  [] a total of 1-2 or more elements   [] a total of 3 or more elements   [x] a total of 4 or more elements   [x] A clinical presentation with:  [] stable and/or uncomplicated characteristics   [] evolving clinical presentation with changing characteristics  [x] unstable and unpredictable characteristics;   [x] Clinical decision making of high  complexity using standardized patient assessment instrument and/or measurable assessment of functional outcome. EVAL (HIGH) 63969 (typically 45 minutes face-to-face)    PLAN: Begin PT focusing on:    Proximal LE strengthening, balance and coordination training    Frequency/Duration:  2 days per week for 6 Weeks:  Interventions:  Therapeutic exercise including: strength and ROM for Upper extremity, Lower extremity and Core   Neuromuscular re-education activation and proprioception for BLEs, balance, and coordination  Therapeutic activity that may include: bed mobility training, transfer training, ADL training, IADL training  Gait training  Home Management training of patient and/or caregiver  Patient education on joint protection, postural re-education, activity modification, progression of HEP. GOALS: Goals established 12/19/22   Patient stated goal: to get rid of dizziness and walk independently  [] Progressing: [] Met: [] Not Met: [] Adjusted    Therapist goals for Patient:   Short Term Goals: To be achieved in: 3 weeks   1. Independent in HEP and progression per patient tolerance.    [] Progressing: [] Met: [] Not Met: [] Adjusted   2. Pt will report dizziness and headache <3/10 to improve tolerance of functional mobility and tasks. [] Progressing: [] Met: [] Not Met: [] Adjusted  3. Pt will amb w/ SPC indoors mod I and outdoors supervision. [] Progressing: [] Met: [] Not Met: [] Adjusted  4. Pt will improve FGA to 23/30 to demonstrate reduced all risk. [] Progressing: [] Met: [] Not Met: [] Adjusted    Long Term Goals: To be achieved in: 6 weeks  1. Pt will improve SIS-16 score to 85% ability. [] Progressing: [] Met: [] Not Met: [] Adjusted  2. Pt will improve 10m walk speed to >=1.0m/s. [] Progressing: [] Met: [] Not Met: [] Adjusted  3. Pt will report dizziness/headache <1/10 to improve tolerance of functional mobility and tasks. [] Progressing: [] Met: [] Not Met: [] Adjusted  4. Pt will improve FGA to 27/30 to demonstrate reduced all risk. [] Progressing: [] Met: [] Not Met: [] Adjusted  5. Pt independent with HEP. [] Progressing: [] Met: [] Not Met: [] Adjusted  6. Pt to report improved confidence with ambulating in community w/ AD as needed.    [] Progressing: [] Met: [] Not Met: [] Adjusted   -       Electronically signed by:  Rainell Opitz, PT, DPT

## 2022-12-20 ENCOUNTER — HOSPITAL ENCOUNTER (OUTPATIENT)
Dept: OCCUPATIONAL THERAPY | Age: 67
Setting detail: THERAPIES SERIES
Discharge: HOME | End: 2022-12-20
Payer: MEDICARE

## 2022-12-20 PROCEDURE — 97166 OT EVAL MOD COMPLEX 45 MIN: CPT

## 2022-12-20 PROCEDURE — 97530 THERAPEUTIC ACTIVITIES: CPT

## 2022-12-20 NOTE — FLOWSHEET NOTE
[facility]  [address] [place] [address] [address], [address]  Phone: [phone]   Fax: [phone]    Occupational Therapy Treatment Note/ Progress Report:     Date:  2022     Patient Name:  Rodrigo Whitley    :  1955  MRN: [id number]      Medical Diagnosis Information:  Diplopia [H53.2]  Cerebral infarction, unspecified [I63.9]   Treatment Diagnosis Information:  Z73.6 (ICD-10-CM) Limitation of activities due to disability    Insurance/Certification information: Medicare  Physician Information:  Crissy Boykin MD   Plan of care signed:    No    Date of Patient follow up with Physician: Neuro-opthalmologist: 2022     Progress Report: []  Yes  [x]  No     Date Range for reporting period:  Beginnin2022  Ending:      Progress report due:      Recertification due (POC duration/ or 90 days whichever is less): 2022     Visit # Insurance Allowable Auth Needed     BMN No     Latex Allergy:  [x]NO      []YES  Preferred Language for Healthcare:   [x]English       []other:    Pain level:  0/10     SUBJECTIVE:  See eval    OBJECTIVE: See eval  Observation:   Test measurements:      Functional Scale:   Score: 57.5/100    RESTRICTIONS/PRECAUTIONS: fall risk    Therapeutic Activities:    Activity #1:     Patient Education:  Role of OT in OP setting -  verb understanding  POC and goals - verb understanding  Purpose of assessment and various areas to be assessed - verb understanding  HEP (Bilateral MP flexion w/ PIP/DIP extension) - verb/demo understanding  Recommended eye patch wear schedule of alternating every 2 hours - verb understanding    Therapeutic Exercises: Exercises in bold performed in department today. Items not bolded are carried forward from prior visits for continuity of the record.     Exercise/Equipment Resistance/Repetitions HEP Other comments   Bilateral MP flexion (with DIP/PIP extension)   10 reps [x] Increased difficulty requiring mod-max VCs and modeling       []        []        []        []        []        []        []        []          []         []        []        []        []        []        []        []        []      Home Exercise Program:  Bilateral MP flexion (with DIP/PIP extension)  2 sets x1-2 minutes daily    Therapeutic Exercise:   [] (78525) Provided verbal/tactile cueing for activities related to strengthening, flexibility, endurance, ROM. Includes review/progression of HEP activities related to strengthening, flexibility, endurance, AROM, proximal shoulder and UE for functional transfers/mobility, self-care, IADLs, and community re-entry    Therapeutic Activities:    [x] (57178) Provided verbal/tactile cueing for activities related to improving balance, coordination, kinesthetic sense, posture, motor skill, proprioception and motor activation to allow for proper function of core, proximal shoulder and UE with self-care, and ADLs, IADLs, functional mobility, work-related and leisure based activities. Includes review/progression of HEP activities to improve balance, coordination, kinesthetic sense, posture, motor skill, proprioception, proximal shoulder and UE for functional transfers/mobility, self-care, IADLs, and community re-entry    Sensory Integration Techniques:  [] (01063)Provided verbal/tactile feedback to enhance sensory processing and promoting responses that are adaptive to environmental demands    Self-Care/Home Management Training:  [] (54386) Provided training and education in restorative and compensatory strategies/activity modifications in activities of daily living, meal preparation, laundry and other various house maintenance activities. Provided training and education in use of adaptive equipment/devices and assistive technology, as needed, to promote participation and independence.     Cognitive Function:  [] (97129 x15 minutes, 28080 +15 minutes) Integrated activities that address attention, memory, reasoning, executive functioning, problem solving, and/or pragmatic functioning in addition to compensatory strategies to manage the performance of an activity (for example, managing time or schedules, initiating, organizing, and sequencing tasks). NMR:  [] (53192) During functional activities/therapeutic exercises, provided facilitation of normal movement patterns and provided handling/verbal cues to promote postural alignment and stability during static and dynamic movement (sitting or standing). Activities may also include visual perceptual training, proprioception training, and balance retraining during functional activities    Manual Treatments:    [] (51580) Provided manual therapy to mobilize UB for the purpose of modulating pain, promoting relaxation,  increasing ROM, reducing/eliminating soft tissue swelling/inflammation/restriction, improving soft tissue extensibility and allowing for proper ROM for normal function with self-care, functional mobility, transfers, reaching and lifting    Modalities:     [] Electrical Stimulation (45164):     [] Ultrasound (62288):    Charges:  Timed Code Treatment Minutes: 15   Total Treatment Minutes: 50      [] EVAL (LOW) 22122 (typically 20 minutes face-to-face)  [x] EVAL (MOD) 77821 (typically 30 minutes face-to-face)  [] EVAL (HIGH) 35443 (typically 45 minutes face-to-face)  [] RE-EVAL     [] PY(46670) x      [] NMR (51938) x       [] Manual (23046) x       [x] TA (02599) x 15 (1)      [] ES(attended) (61124)       [] ES (un) (32680)  [] Other:    GOALS:   Patient stated goal: return to driving, increase mobility, no ringing in ears, and improve vision  [] Progressing: [] Met: [] Not Met: [] Adjusted     Therapist goals for Patient:      Short Term Goals: To be achieved in: 4 weeks  1. Pt will improve score on UEFS to 75/100 for a subjective report of decreased difficulty with completing every day activities.   [] Progressing: [] Met: [] Not Met: [] Adjusted  2. Pt will write for 1 minute in cursive style with 75% accuracy to demo improved tolerance with fine motor activities suggesting improved strength of intrinsic muscles of R hand. [] Progressing: [] Met: [] Not Met: [] Adjusted  3. Pt will complete 1 IADL task per pt preference in stance x3 minutes to improve stamina with vision and standing activities to facilitate return to PLOF. [] Progressing: [] Met: [] Not Met: [] Adjusted  4. Pt will be Min A with HEP/Home activities program to facilitate independence with carryover from sessions and to progress towards returning to PLOF  [] Progressing: [] Met: [] Not Met: [] Adjusted     Long Term Goals: To be achieved in: 8 weeks  1. Pt will improve score on UEFS to 90/100 for a subjective report of decreased difficulty with completing every day activities. [] Progressing: [] Met: [] Not Met: [] Adjusted  2. Pt will write for 3 minutes in cursive style with 90% accuracy to demo improved tolerance with fine motor activities suggesting improved strength of intrinsic muscles of R hand. [] Progressing: [] Met: [] Not Met: [] Adjusted  3. Pt will complete 1 IADL task per pt preference in stance x5 minutes to improve stamina with vision and standing activities to facilitate return to PLOF. [] Progressing: [] Met: [] Not Met: [] Adjusted  4. Pt will be Independent with HEP/Home activities program to facilitate independence with carryover from sessions and to progress towards returning to PLOF  [] Progressing: [] Met: [] Not Met: [] Adjusted    ASSESSMENT:  See eval    Treatment/Activity Tolerance:  [x] Patient tolerated treatment well [] Patient limited by fatique  [] Patient limited by pain  [] Patient limited by other medical complications  [] Other:     Overall Progression Towards Functional goals/ Treatment Progress Update:  [] Patient is progressing as expected towards functional goals listed.     [] Progression is slowed due to complexities/Impairments listed. [] Progression has been slowed due to co-morbidities. [x] Plan just implemented, too soon to assess goals progression <30days   [] Goals require adjustment due to lack of progress  [] Patient is not progressing as expected and requires additional follow up with physician  [] Other    Prognosis for POC: [x] Good [] Fair  [] Poor    Patient requires continued skilled intervention: [x] Yes  [] No        PLAN: See eval  [] Continue per plan of care [] Alter current plan (see comments)  [x] Plan of care initiated [] Hold pending MD visit [] Discharge    Electronically signed by: Agusto Bravo OT    Note: If patient does not return for scheduled/recommended follow up visits, this note will serve as a discharge from care along with the most recent update on progress.

## 2022-12-20 NOTE — PLAN OF CARE
[facility]patient Therapy  [address], [place] [address], [address]  Phone: [phone]   Fax: [phone]                                               Occupational Therapy Certification    Dear Dr. Nelsy Weinstein MD ,    We had the pleasure of evaluating the following patient for occupational therapy services at [facility]. A summary of our findings can be found in the initial assessment below. This includes our plan of care. If you have any questions or concerns regarding these findings, please do not hesitate to contact me at the office phone number checked above. Thank you for the referral.         Physician Signature:_______________________________Date:__________________  By signing above (or electronic signature), therapist's plan is approved by physician      Patient: Marcos Dale   : 1955  MRN: [id number]  Referring Physician: Nelsy Weinstein MD       Evaluation Date: 2022       Medical Diagnosis Information: Diplopia [H53.2]  Cerebral infarction, unspecified [I63.9]   Treatment Diagnosis Information: Z73.6 (ICD-10-CM) Limitation of activities due to disability                                       Insurance information: Medicare    Onset Date: 2022      Follow-Up Appointments: Neuro-opthalmologist: 2022     Precautions/ Contra-indications: fall risk  Latex Allergy:  [x]NO      []YES   Preferred Language for Healthcare:   [x]English       []other:  C-SSRS Triggered by Intake questionnaire (Past 2 wk assessment):   [x] No, Questionnaire did not trigger screening.   [] Yes, Patient intake triggered further evaluation      [] C-SSRS Screening completed  [] PCP notified via Plan of Care  [] Emergency services notified    SUBJECTIVE: Pt reported wearing an eye patch per recommendation from ophthalmologist. Pt reported that vision is improving daily although still impaired. Spouse present throughout OT assessment. History of Present Illness: Pt presented [facility] ED on 11/30/[facility]  with unsteady gait, left facial weakness, and dysarthria, found to have small right medial thalamic acute ischemic stroke but also a portion which is contiguous and involves upper portion of midbrain on the right as well. Per neurology consult \"This would explain her left-sided symptoms and her mild dysconjugate gaze leading to diplopia. Stroke in this location is undoubtedly due to small vessel ischemic disease and is very unlikely to be embolic. Much more likely due to vascular disease and therefore mainstay of treatment going forward is tight control of vascular risk factors\"     Pt currently c/o significant dizziness and headache that is worsened w/ movement/activity/noise/riding in car. She has constant tinnitus now (severity of 3/10 at beginning of session increasing to 6/10 at EOS). She c/o fatigue, sleeping more than normal. She is very fearful of falling and was prior to CVA due to her mother sustaining a fall recently that \"changed her life\". Pt not able to tolerate cooking, only minimal cleaning like wiping off table and minimal laundry due to dizziness. She tries to get up and walk every hour. Pt denies weakness in arms or legs. She was previously not on any medication, but is now on BP, blood thinner and cholesterol meds. She saw an ophthalmologist at [facility] who recommended use of eye patch, alternating eyes evenly.     Relevant Medical History:     Co-morbidities/Complexities (which will affect course of rehabilitation):     []None          []Hx of COVID   Arthritic conditions   []Rheumatoid arthritis (M05.9)  [x]Osteoarthritis (M19.91)  []Gout    Cardiovascular conditions   []Hypertension (I10)  [x]Hyperlipidemia (E78.5)  []Angina pectoris (I20)  []Atherosclerosis (I70)  []Pacemaker  []Hx of CABG/stent/  cardiac surgeries    Musculoskeletal conditions   []Disc pathology   []Congenital spine pathologies   []Osteoporosis (M81.8)  []Osteopenia (M85.8)  []Scoliosis         Endocrine conditions   []Hypothyroid (E03.9)  []Hyperthyroid Gastrointestinal conditions   []Constipation (K59.00)  [] Diarrhea    Metabolic conditions   []Morbid obesity (E66.01)  []Diabetes type 1(E10.65) or 2 (E11.65)   []Neuropathy (G60.9)      Cardio/Pulmonary conditions   []Asthma (J45)  []Coughing   []COPD (J44.9)  []CHF  []A-fib    Psychological Disorders  []Anxiety (F41.9)  []Depression (F32.9)   []Other:    Developmental Disorders  []Autism (F84.0)  []CP (G80)  []Down Syndrome (Q90.9)  []Developmental delay      Neurological conditions  []Prior Stroke (I69.30)  []Parkinson's (G20)  []Encephalopathy (G93.40)  []MS (G35)  []Post-polio (G14)  []SCI  []TBI  []ALS Other conditions  []Fibromyalgia (M79.7)  []Vertigo  []Syncope  []Kidney Failure  []Cancer      []currently undergoing                treatment  []Pregnancy  []Incontinence    Prior surgeries  []involved limb  []previous spinal surgery  [] section birth  []hysterectomy  []bowel / bladder surgery  []other relevant surgeries  suburethral sling procedure      Pain Yes  Patient reports pain is 0/10 pain at present and 5/10 pain at its worst.  Pain increases with: lights, filling out forms, overstimulating (needs to go somewhere quiet)         Decreases with: rest and dark room, constant ringing in ears - likes to have other noise simulation to advoid it (TV, music)  Pain description:  throbbing, tingling     Current Functional Limitations:   Functional Complaints: depth-perception issues with retrieving items in space, fatigues easily. PLOF:  No functional limitations  Pt's sleep is affected?  No    Social support/Environment:    Lives with: spouse - youngest daughter (and  and dog) moved in temporarily while looking for a house    Family/caregiver support needed prior to current illness: No      Home Environment:  2-story and Main bath/bedroom upstairs - laundry on first floor  Number of TRU: 2 (12-13 to 2nd floor with HR on R side)    Bathroom Accessibility: Accessible    Bathroom Environment: None    Other Equipment:  Straight cane    Occupation/School: Retired    Active : No; would like to return to driving    Leisure/Hobbies: takes care of everyone, son had back surgery 2 weeks ago. Going to [place] for the holidays. OBJECTIVE:     Hand Dominance: right    Observations:  Posture: normal  Bandages/Dressings/Incisions: no  Edema: no    Functional Outcome Measure:    Date Assessed: 12/20/2022  Measure Used: UEFS  Score: 57.5/100 (0 marked for items not yet attempted since stroke - pt unable to estimate performance)    ADL and IADL Status:  Current ADL Status: Independent  Current IADL Status: Requires assist  Additional Comments: limited IADL participation d/t vision, dizziness, and unsteadiness with standing    Cognitive Status: WFL    Cardiopulmonary Status   [x]NT  Blood pressure:   Heart Rate:   SpO2:     Tone     Normal  Location: Bilateral    Trunk Control     Good    Vision:   Impaired  Wears glasses: All the time  Visual Tracking: Impaired convergence, smooth pursuits normal. Saccades normal, fatigues easily  Visual Fields: Intact      Visual Perceptual: plan to continue to assess  MVPT: items #22-31 correct  Trails A: NT - plan to test at a later date  Trails B: NT - plan to test at a later date    Hearing:    WNL    Sensation    Light touch:   WNL     Proprioception:  WNL     Sharp/dull: NT    Stereognosis: NT    Eos ringing as bad as a 6/10  3/10    Upper Extremity ROM and Strength Assessment: Items not marked are items that are WFL         RUE    RUE     RUE     LUE       LUE        LUE    PROM AROM MMT PROM AROM MMT   Shoulder         Flexion    (0-180)               Abduction (0-180)               Extension (0-45)               ER    (0-90)               IR   (0-70)                              Elbow               Flexion (0-145)               Extension (145-0) Supination (0-90)         Pronation (0-90)                  Wrist              Flexion   (0-80)               Extension (0-70)               Radial Deviation (0-20)               Ulnar Deviation (0-45)                                Hand ROM Assessment: WFL although had difficulty with MP flexion + PIP and DIP extension    Hand Strength Assessment: WFL grossly, suspect mild intrinsic hand weakness in R hand. Plan to cont to assess     Strength (pounds)  --R hand: NT - plan to test at next session  --L hand: NT - plan to test at next session    Coordination Testing:       Box and Blocks Test (blocks/minute): Standing  --R hand: 55 blocks in one minute which is less than the mean compared to patient's age range  --L hand: 57 blocks in one minute which is less than the mean compared to patient's age range    Nine Hole Peg Test (seconds): Standing  --R hand: 20.81 seconds which is about the 75th percentile compared to norms for patient's age range    --L hand: 24.91 seconds which is about the 50th percentile compared to norms for patient's age range    Handwriting Assessment: percent of legibility  Assessed patient's hand writing both in print and cursive styles. 100% legibility for print. Increase time and increased difficulty with cursive, decreased legibility noted. Plan to continue to assess     Functional Mobility    Device used: SPC  Level of assist: Independent and Supervision    Balance    Static Sitting: Normal        Dynamic Sitting: Normal    Static Stance: Good        Dynamic Stance: Good and Fair                      [x] Patient history, allergies, meds reviewed. Medical chart reviewed. See intake form. Review Of Systems (ROS):  [x]Performed Review of systems (Integumentary, CardioPulmonary, Neurological) by intake and observation. Intake form has been scanned into medical record.  Patient has been instructed to contact their primary care physician regarding ROS issues if not already being addressed at this time. Barriers to/and or personal factors that will affect rehab potential: Co-morbidities                ASSESSMENT    Pt is a 78year old female s/p stroke presenting with functional complaints of impaired vision and tinnitus. OT assessment revealed decreased strength in R hand during high-level fine motor coordination tasks, decreased timing and speed with R hand, decreased endurance with visual tasks and impaired convergence. Pt has limited participation in IADLs and community reintegration including driving secondary to overstimulation, decreased endurance with visual tasks, and poor tolerance to bright lights. Pt to benefit from skilled OT in OP setting to address functional impairments and activity limitations noted below. A POC of 1-2 days per week x 8 weeks is recommended to facilitate return to PLOF. Patient and spouse in agreement with plan.      Functional Impairments: Decreased strength, Increased pain, Decrease functional activity tolerance, Decreased high level IADLs, Decreased coordination, Decreased fine motor control, Decreased vision/visual deficit, Decreased balance, and Decreased functional mobility     Functional Activity Limitations (from functional questionnaire and intake): Reduced ability or difficulty with changes of positions or transfers between positions, Reduced ability to maintain good posture and demonstrate good body mechanics with sitting, bending, and lifting, Reduced ability or tolerance with driving , and Reduced ability to perform lifting, reaching, and/or carrying tasks     Participation Restrictions: Reduced participation in home management activities, Reduced participation in social activities, and Reduced participation in sport/recreational activities    Tolerance of evaluation/treatment: Good and Fair      Occupational Therapy Evaluation Complexity Justification    [x] A history of present problem with:  [] brief history including review of medical records relating to the problem; no personal factors and/or comorbidities that impact the plan of care  [x] expanded review of medical records and additional review of physical, cognitive, or psychosocial history related to current functional performance  [] extensive review of medical records and additional review of physical, cognitive, or psychosocial history related to current functional performance    [x] An examination of body systems using standardized tests and measures addressing any of the following: body structures and functions (impairments), activity limitations, and/or participation restrictions:  [] a total of 1-3 elements   [] a total of 3-5 elements   [x] a total of 5 or more elements     [x] A clinical presentation with:  [] stable and/or uncomplicated characteristics; no personal factors and/or comorbidities that impact the plan of care  [x] evolving clinical presentation w/ changing characteristics; min/mod assistance or modifications required to perform tasks. May have comorbidities that affect occupational performance  [] unstable and unpredictable characteristics; significant assistance or modifications required to perform tasks. Have comorbidities that affect occupational performance. [x] Clinical decision making of [] low, [x] moderate, [] high complexity using standardized patient assessment instrument and/or measurable assessment of functional outcome.      [] EVAL (LOW) 72440 (typically 20 minutes face-to-face)  [x] EVAL (MOD) 87828 (typically 30 minutes face-to-face)  [] EVAL (HIGH) 10941 (typically 45 minutes face-to-face)  [] RE-EVAL 64173    PLAN     Frequency/Duration:  2 days per week for 8 Weeks:    Interventions/Procedural Codes:  [x]  Therapeutic exercise (70747) including strength training, ROM, endurance training and flexibility  [x]  Neuromuscular re-education (00542) including facilitation of normal movement patterns, promoting postural alignment and stability during static and dynamic movement (sitting or standing), visual perceptual training, proprioception training, balance retraining during functional activities  []  Manual therapy ([id number]) including tissue mobilization, joint mobilization, massage, passive ROM   []  Modalities as needed that may include thermal agents, attended E-stim (11296), Biofeedback, US (62726), iontophoresis as indicated  [x]  Therapeutic activity (24314) that may include patient/caregiver education/training, activity modification, increasing participation and independence with functional activities seated/standing, engagement in fine and gross motor activities, functional transfers and mobility  [] Sensory integration techniques (90574) that include enhancing sensory processing and promoting responses that are adaptive to environmental demands  [x] Self-care/home management training (12876) that includes restorative and compensatory training in activities of daily living, meal preparation, laundry and other various house maintenance activities. May include education and training in use of adaptive equipment/devices and assistive technology to promote participation and independence. [] Cognitive function (52772 initial 15 minutes, 50065 each additional 15 minutes w/ maximum of 3 units billable) activities that address attention, memory, reasoning, executive functioning, problem solving, and/or pragmatic functioning in addition to compensatory strategies to manage the performance of an activity (for example, managing time or schedules, initiating, organizing, and sequencing tasks). GOALS:  Patient stated goal: return to driving, increase mobility, no ringing in ears, and improve vision  [] Progressing: [] Met: [] Not Met: [] Adjusted    Therapist goals for Patient:     Short Term Goals: To be achieved in: 4 weeks  1. Pt will improve score on UEFS to 75/100 for a subjective report of decreased difficulty with completing every day activities.   [] Progressing: [] Met: [] Not Met: [] Adjusted  2. Pt will write for 1 minute in cursive style with 75% accuracy to demo improved tolerance with fine motor activities suggesting improved strength of intrinsic muscles of R hand. [] Progressing: [] Met: [] Not Met: [] Adjusted  3. Pt will complete 1 IADL task per pt preference in stance x3 minutes to improve stamina with vision and standing activities to facilitate return to PLOF. [] Progressing: [] Met: [] Not Met: [] Adjusted  4. Pt will be Min A with HEP/Home activities program to facilitate independence with carryover from sessions and to progress towards returning to PLOF  [] Progressing: [] Met: [] Not Met: [] Adjusted    Long Term Goals: To be achieved in: 8 weeks  1. Pt will improve score on UEFS to 90/100 for a subjective report of decreased difficulty with completing every day activities. [] Progressing: [] Met: [] Not Met: [] Adjusted  2. Pt will write for 3 minutes in cursive style with 90% accuracy to demo improved tolerance with fine motor activities suggesting improved strength of intrinsic muscles of R hand. [] Progressing: [] Met: [] Not Met: [] Adjusted  3. Pt will complete 1 IADL task per pt preference in stance x5 minutes to improve stamina with vision and standing activities to facilitate return to PLOF. [] Progressing: [] Met: [] Not Met: [] Adjusted  4.  Pt will be Independent with HEP/Home activities program to facilitate independence with carryover from sessions and to progress towards returning to PLOF  [] Progressing: [] Met: [] Not Met: [] Adjusted    Timed Code Treatment Minutes: 35 minutes    Total Treatment Minutes:  50 minutes  25 (mod complex eval), 15 (TA)      Electronically signed by:  Faith Hunter OT

## 2023-01-03 ENCOUNTER — HOSPITAL ENCOUNTER (OUTPATIENT)
Dept: OCCUPATIONAL THERAPY | Age: 68
Setting detail: THERAPIES SERIES
Discharge: HOME | End: 2023-01-03

## 2023-01-03 NOTE — FLOWSHEET NOTE
[facility] Outpatient Therapy  [address] [place] [address] D[address], [address]  Phone: [phone]   Fax: [phone]    Occupational Therapy Treatment Note/ Progress Report:     Date:  2023     Patient Name:  Alejandro Hernandez    :  1955  MRN: [id number]      Medical Diagnosis Information:  Diplopia [H53.2]  Cerebral infarction, unspecified [I63.9]   Treatment Diagnosis Information:  Z73.6 (ICD-10-CM) Limitation of activities due to disability    Insurance/Certification information: Medicare  Physician Information:  Willem Gutierrez MD   Plan of care signed:    No    Date of Patient follow up with Physician: Neuro-opthalmologist: 2022     Progress Report: []  Yes  [x]  No     Date Range for reporting period:  Beginnin2022  Ending:      Progress report due:      Recertification due (POC duration/ or 90 days whichever is less): 2022     Visit # Insurance Allowable Auth Needed     BMN No     Latex Allergy:  [x]NO      []YES  Preferred Language for Healthcare:   [x]English       []other:    Pain level:  0/10     SUBJECTIVE:      OBJECTIVE:   Observation:   Test measurements:      Functional Scale:   Score: 57.5/100    RESTRICTIONS/PRECAUTIONS: fall risk    Therapeutic Activities:    Activity #1:     Patient Education:      Therapeutic Exercises: Exercises in bold performed in department today. Items not bolded are carried forward from prior visits for continuity of the record.     Exercise/Equipment Resistance/Repetitions HEP Other comments   Bilateral MP flexion (with DIP/PIP extension)   10 reps [x] Increased difficulty requiring mod-max VCs and modeling       []        []        []        []        []        []        []        []          []         []        []        []        []        []        []        []        []      Home Exercise Program:  Bilateral MP flexion (with DIP/PIP extension)  2 sets x1-2 minutes daily    Therapeutic Exercise:   [] (21764) Provided verbal/tactile cueing for activities related to strengthening, flexibility, endurance, ROM. Includes review/progression of HEP activities related to strengthening, flexibility, endurance, AROM, proximal shoulder and UE for functional transfers/mobility, self-care, IADLs, and community re-entry    Therapeutic Activities:    [x] (13954) Provided verbal/tactile cueing for activities related to improving balance, coordination, kinesthetic sense, posture, motor skill, proprioception and motor activation to allow for proper function of core, proximal shoulder and UE with self-care, and ADLs, IADLs, functional mobility, work-related and leisure based activities. Includes review/progression of HEP activities to improve balance, coordination, kinesthetic sense, posture, motor skill, proprioception, proximal shoulder and UE for functional transfers/mobility, self-care, IADLs, and community re-entry    Sensory Integration Techniques:  [] (87711)Provided verbal/tactile feedback to enhance sensory processing and promoting responses that are adaptive to environmental demands    Self-Care/Home Management Training:  [] (72552) Provided training and education in restorative and compensatory strategies/activity modifications in activities of daily living, meal preparation, laundry and other various house maintenance activities. Provided training and education in use of adaptive equipment/devices and assistive technology, as needed, to promote participation and independence. Cognitive Function:  [] (90302 x15 minutes, 18699 +15 minutes) Integrated activities that address attention, memory, reasoning, executive functioning, problem solving, and/or pragmatic functioning in addition to compensatory strategies to manage the performance of an activity (for example, managing time or schedules, initiating, organizing, and sequencing tasks).     NMR:  [] (91602) During functional activities/therapeutic exercises, provided facilitation of normal movement patterns and provided handling/verbal cues to promote postural alignment and stability during static and dynamic movement (sitting or standing). Activities may also include visual perceptual training, proprioception training, and balance retraining during functional activities    Manual Treatments:    [] (31073) Provided manual therapy to mobilize UB for the purpose of modulating pain, promoting relaxation,  increasing ROM, reducing/eliminating soft tissue swelling/inflammation/restriction, improving soft tissue extensibility and allowing for proper ROM for normal function with self-care, functional mobility, transfers, reaching and lifting    Modalities:     [] Electrical Stimulation (74824):     [] Ultrasound (54605):    Charges:  Timed Code Treatment Minutes: Total Treatment Minutes:       [] EVAL (LOW) 83668 (typically 20 minutes face-to-face)  [] EVAL (MOD) 06019 (typically 30 minutes face-to-face)  [] EVAL (HIGH) 67874 (typically 45 minutes face-to-face)  [] RE-EVAL     [] JT(27244) x      [] NMR (22915) x       [] Manual (20019) x       [] TA (17157) x     [] ES(attended) (12695)       [] ES (un) (27899)  [] Other:    GOALS:   Patient stated goal: return to driving, increase mobility, no ringing in ears, and improve vision  [] Progressing: [] Met: [] Not Met: [] Adjusted     Therapist goals for Patient:      Short Term Goals: To be achieved in: 4 weeks  1. Pt will improve score on UEFS to 75/100 for a subjective report of decreased difficulty with completing every day activities. [] Progressing: [] Met: [] Not Met: [] Adjusted  2. Pt will write for 1 minute in cursive style with 75% accuracy to demo improved tolerance with fine motor activities suggesting improved strength of intrinsic muscles of R hand. [] Progressing: [] Met: [] Not Met: [] Adjusted  3.  Pt will complete 1 IADL task per pt preference in stance x3 minutes to improve stamina with vision and standing activities to facilitate return to PLOF. [] Progressing: [] Met: [] Not Met: [] Adjusted  4. Pt will be Min A with HEP/Home activities program to facilitate independence with carryover from sessions and to progress towards returning to PLOF  [] Progressing: [] Met: [] Not Met: [] Adjusted     Long Term Goals: To be achieved in: 8 weeks  1. Pt will improve score on UEFS to 90/100 for a subjective report of decreased difficulty with completing every day activities. [] Progressing: [] Met: [] Not Met: [] Adjusted  2. Pt will write for 3 minutes in cursive style with 90% accuracy to demo improved tolerance with fine motor activities suggesting improved strength of intrinsic muscles of R hand. [] Progressing: [] Met: [] Not Met: [] Adjusted  3. Pt will complete 1 IADL task per pt preference in stance x5 minutes to improve stamina with vision and standing activities to facilitate return to PLOF. [] Progressing: [] Met: [] Not Met: [] Adjusted  4. Pt will be Independent with HEP/Home activities program to facilitate independence with carryover from sessions and to progress towards returning to PLOF  [] Progressing: [] Met: [] Not Met: [] Adjusted    ASSESSMENT:      Treatment/Activity Tolerance:  [x] Patient tolerated treatment well [] Patient limited by fatique  [] Patient limited by pain  [] Patient limited by other medical complications  [] Other:     Overall Progression Towards Functional goals/ Treatment Progress Update:  [] Patient is progressing as expected towards functional goals listed. [] Progression is slowed due to complexities/Impairments listed. [] Progression has been slowed due to co-morbidities.   [x] Plan just implemented, too soon to assess goals progression <30days   [] Goals require adjustment due to lack of progress  [] Patient is not progressing as expected and requires additional follow up with physician  [] Other    Prognosis for POC: [x] Good [] Fair  [] Poor    Patient requires continued skilled intervention: [x] Yes  [] No        PLAN:   [x] Continue per plan of care [] Alter current plan (see comments)  [] Plan of care initiated [] Hold pending MD visit [] Discharge    Electronically signed by: Cindi Ortega OT    Note: If patient does not return for scheduled/recommended follow up visits, this note will serve as a discharge from care along with the most recent update on progress.

## 2023-01-03 NOTE — CARE COORDINATION
[facility] Outpatient Therapy  [address] [place] [address] Carlin Hastings [address]  Phone: [phone]   Fax: [phone]    Occupational Therapy Missed Visit Note     Date:  1/3/2023    Patient Name:  Tee Ramírez      :  1955    MRN: [id number]      Cancelled visits to date: 0  No-shows to date: 1 (1/3/2022)    For today's appointment patient:  []  Cancelled  []  Rescheduled appointment  [x]  No-show     Reason given by patient:  []  Patient ill  []  Conflicting appointment  []  No transportation    []  Conflict with work  [x]  No reason given  []  Other:     Comments:      Electronically signed by:  Eloise Bird OT,

## 2023-01-04 ENCOUNTER — HOSPITAL ENCOUNTER (OUTPATIENT)
Dept: OCCUPATIONAL THERAPY | Age: 68
Setting detail: THERAPIES SERIES
Discharge: HOME | End: 2023-01-04

## 2023-01-04 NOTE — CARE COORDINATION
[facility] Outpatient Therapy  [address] [place] [address], [address] [place] [address]  Phone: [phone]   Fax: [phone]    Occupational Therapy Missed Visit Note     Date:  2023    Patient Name:  Ruth Agosto      :  1955    MRN: [id number]      Cancelled visits to date: 0  No-shows to date: 2 (2023, 1/3/2023)    For today's appointment patient:  []  Cancelled  []  Rescheduled appointment  [x]  No-show     Reason given by patient:  []  Patient ill  []  Conflicting appointment  []  No transportation    []  Conflict with work  [x]  No reason given  [x]  Other: called patient who stated she was not aware of appointments this week (she is in [place]). Apologized and said she would be here at next scheduled appointment which is .      Comments:      Electronically signed by:  Sherry Steven OT,

## 2023-01-17 ENCOUNTER — HOSPITAL ENCOUNTER (OUTPATIENT)
Dept: PHYSICAL THERAPY | Age: 68
Setting detail: THERAPIES SERIES
Discharge: HOME | End: 2023-01-17
Payer: MEDICARE

## 2023-01-17 ENCOUNTER — HOSPITAL ENCOUNTER (OUTPATIENT)
Dept: OCCUPATIONAL THERAPY | Age: 68
Setting detail: THERAPIES SERIES
Discharge: HOME | End: 2023-01-17
Payer: MEDICARE

## 2023-01-17 PROCEDURE — 97112 NEUROMUSCULAR REEDUCATION: CPT

## 2023-01-17 PROCEDURE — 97110 THERAPEUTIC EXERCISES: CPT

## 2023-01-17 PROCEDURE — 97530 THERAPEUTIC ACTIVITIES: CPT

## 2023-01-17 NOTE — FLOWSHEET NOTE
[facility] Outpatient Therapy  [address] [facility] [facility] [address] [facility] [address], [address], [address] [place] [address] Av  Phone: [phone]   Fax: [phone]    Occupational Therapy Treatment Note/ Progress Report:     Date:  2023     Patient Name:  Narinder West    :  1955  MRN: [id number]      Medical Diagnosis Information:  Diplopia [H53.2]  Cerebral infarction, unspecified [I63.9]   Treatment Diagnosis Information:  Z73.6 (ICD-10-CM) Limitation of activities due to disability    Insurance/Certification information: Medicare  Physician Information:  Claudette Wharton MD   Plan of care signed:    No    Date of Patient follow up with Physician: Neuro-opthalmologist: 2022     Progress Report: []  Yes  [x]  No     Date Range for reporting period:  Beginnin2022  Ending:      Progress report due:      Recertification due (POC duration/ or 90 days whichever is less): 2022     Visit # Insurance Allowable Auth Needed     BMN No     Latex Allergy:  [x]NO      []YES  Preferred Language for Healthcare:   [x]English       []other:    Pain level:  0/10     SUBJECTIVE: Neurology visit was yesterday 2023, recommending patient does a sleep study. Pt stated her head \"rings:\" when bending forward to load/unload the  and the ringing in the ear subsides when she completes tasks seated versus standing. Pt reported compliance with HEP    OBJECTIVE:   Observation: impaired fine motor control of R hand  Test measurements:      Functional Scale:   Score: 57.5/100    RESTRICTIONS/PRECAUTIONS: fall risk    Therapeutic Activities:    Activity #1: Spot the difference activity completed to work on visual processing. Pt completed in 1 minute and 51 seconds with 1 error. Activity #2: Pt instructed to complete maze to work on visual scanning and processing and to work on fine motor control of R hand for handwriting.  Pt completed in 1 minute 33 seconds with min VCs to go from beginning to end versus working way backwards. Activity #3: Pt instructed to \"doodle\" to work on fine motor control for continuous lines which is needed for cursive style of handwriting. Pt completed 2 of each: [place], triangle and square with multiple deviations and increase difficulty with uniformity throughout. Pt then wrote 1 \"spiked\" and 2 \"smooth\" lined waves with fair-poor accuracy compared to OT's example. Activity #4: Functional reaching + cog + standing balance activities completed to work on reaction time and for skills for return to driving (use of peripheral vision, divided attention, etc). Various targets used on wall, pt stood ~1-2 ft from wall during activity. Pt completed multiple sets:   A. Letters of the alphabet, A-J: 2 minutes 22 seconds   B. Letters of the alphabet, reverse, J-A: 1 minute 41 seconds    Activity #5: Pt was issued one page from an adult coloring book and instructed to color. Activity completed to work on fine motor control of R hand. Pt engaged in activity ~8 minutes with increased difficulty for uniformity and to stay inside the lines. Pt to finish as homework. Pt in agreement. Activity #6: Pt worked on finger<->palm translation of R hand using blocks of various shapes and sizes. Pt retrieved blocks one at a time to store in palm of hand. Pt then instructed to translate palm->finger to tip to tip pincer grasp to place each block on top of each other into a vertical tower to work on fine motor coordination and steadiness of the R hand. Pt completed 1 set x4 blocks and 3 sets x5 blocks. Patient Education:   Progress report for next session - verb understanding    Therapeutic Exercises: Exercises in bold performed in department today. Items not bolded are carried forward from prior visits for continuity of the record.     Exercise/Equipment Resistance/Repetitions HEP Other comments   Bilateral MP flexion (with DIP/PIP extension)   10 reps [x] Increased difficulty requiring mod-max VCs and modeling       []        []        []        []        []        []        []        []          []         []        []        []        []        []        []        []        []      Home Exercise Program:  Bilateral MP flexion (with DIP/PIP extension)  2 sets x1-2 minutes daily    Therapeutic Exercise:   [] (74930) Provided verbal/tactile cueing for activities related to strengthening, flexibility, endurance, ROM. Includes review/progression of HEP activities related to strengthening, flexibility, endurance, AROM, proximal shoulder and UE for functional transfers/mobility, self-care, IADLs, and community re-entry    Therapeutic Activities:    [x] (84231) Provided verbal/tactile cueing for activities related to improving balance, coordination, kinesthetic sense, posture, motor skill, proprioception and motor activation to allow for proper function of core, proximal shoulder and UE with self-care, and ADLs, IADLs, functional mobility, work-related and leisure based activities. Includes review/progression of HEP activities to improve balance, coordination, kinesthetic sense, posture, motor skill, proprioception, proximal shoulder and UE for functional transfers/mobility, self-care, IADLs, and community re-entry    Sensory Integration Techniques:  [] (63669)Provided verbal/tactile feedback to enhance sensory processing and promoting responses that are adaptive to environmental demands    Self-Care/Home Management Training:  [] (44327) Provided training and education in restorative and compensatory strategies/activity modifications in activities of daily living, meal preparation, laundry and other various house maintenance activities. Provided training and education in use of adaptive equipment/devices and assistive technology, as needed, to promote participation and independence.     Cognitive Function:  [] (97129 x15 minutes, 10377 +15 minutes) Integrated activities that address attention, memory, reasoning, executive functioning, problem solving, and/or pragmatic functioning in addition to compensatory strategies to manage the performance of an activity (for example, managing time or schedules, initiating, organizing, and sequencing tasks). NMR:  [] (47042) During functional activities/therapeutic exercises, provided facilitation of normal movement patterns and provided handling/verbal cues to promote postural alignment and stability during static and dynamic movement (sitting or standing). Activities may also include visual perceptual training, proprioception training, and balance retraining during functional activities    Manual Treatments:    [] (42909) Provided manual therapy to mobilize UB for the purpose of modulating pain, promoting relaxation,  increasing ROM, reducing/eliminating soft tissue swelling/inflammation/restriction, improving soft tissue extensibility and allowing for proper ROM for normal function with self-care, functional mobility, transfers, reaching and lifting    Modalities:     [] Electrical Stimulation (07288):     [] Ultrasound (24401):    Charges:  Timed Code Treatment Minutes: 55   Total Treatment Minutes: 55      [] EVAL (LOW) 27402 (typically 20 minutes face-to-face)  [] EVAL (MOD) 49725 (typically 30 minutes face-to-face)  [] EVAL (HIGH) 48246 (typically 45 minutes face-to-face)  [] RE-EVAL     [] BA(07450) x      [] NMR (29024) x       [] Manual (18093) x       [x] TA (81528) x 55 (4)      [] ES(attended) (20286)       [] ES (un) (86832)  [] Other:    GOALS:   Patient stated goal: return to driving, increase mobility, no ringing in ears, and improve vision  [] Progressing: [] Met: [] Not Met: [] Adjusted     Therapist goals for Patient:      Short Term Goals: To be achieved in: 4 weeks  1. Pt will improve score on UEFS to 75/100 for a subjective report of decreased difficulty with completing every day activities. [] Progressing: [] Met: [] Not Met: [] Adjusted  2.  Pt will write for 1 minute in cursive style with 75% accuracy to demo improved tolerance with fine motor activities suggesting improved strength of intrinsic muscles of R hand. [] Progressing: [] Met: [] Not Met: [] Adjusted  3. Pt will complete 1 IADL task per pt preference in stance x3 minutes to improve stamina with vision and standing activities to facilitate return to PLOF. [] Progressing: [] Met: [] Not Met: [] Adjusted  4. Pt will be Min A with HEP/Home activities program to facilitate independence with carryover from sessions and to progress towards returning to PLOF  [] Progressing: [] Met: [] Not Met: [] Adjusted     Long Term Goals: To be achieved in: 8 weeks  1. Pt will improve score on UEFS to 90/100 for a subjective report of decreased difficulty with completing every day activities. [] Progressing: [] Met: [] Not Met: [] Adjusted  2. Pt will write for 3 minutes in cursive style with 90% accuracy to demo improved tolerance with fine motor activities suggesting improved strength of intrinsic muscles of R hand. [] Progressing: [] Met: [] Not Met: [] Adjusted  3. Pt will complete 1 IADL task per pt preference in stance x5 minutes to improve stamina with vision and standing activities to facilitate return to PLOF. [] Progressing: [] Met: [] Not Met: [] Adjusted  4. Pt will be Independent with HEP/Home activities program to facilitate independence with carryover from sessions and to progress towards returning to PLOF  [] Progressing: [] Met: [] Not Met: [] Adjusted    ASSESSMENT:    Pt demo'd good tolerance for session this date - increased awareness to fine motor deficits of R hand despite R thalamic stroke. Pt benefits from repetition, then fatigues quickly. Pt to benefit from continuous of therapeutic activities that challenge fine motor control and visual processing. Cont per POC.      Treatment/Activity Tolerance:  [x] Patient tolerated treatment well [] Patient limited by fatique  [] Patient limited by pain  [] Patient limited by other medical complications  [] Other:     Overall Progression Towards Functional goals/ Treatment Progress Update:  [] Patient is progressing as expected towards functional goals listed. [] Progression is slowed due to complexities/Impairments listed. [] Progression has been slowed due to co-morbidities. [x] Plan just implemented, too soon to assess goals progression <30days   [] Goals require adjustment due to lack of progress  [] Patient is not progressing as expected and requires additional follow up with physician  [] Other    Prognosis for POC: [x] Good [] Fair  [] Poor    Patient requires continued skilled intervention: [x] Yes  [] No        PLAN:   [x] Continue per plan of care [] Alter current plan (see comments)  [] Plan of care initiated [] Hold pending MD visit [] Discharge    Electronically signed by: Lubna Weller OT    Note: If patient does not return for scheduled/recommended follow up visits, this note will serve as a discharge from care along with the most recent update on progress.

## 2023-01-17 NOTE — FLOWSHEET NOTE
[facility]  [address] [place] [address], [address] 75, [address]  Phone: [phone]   Fax: [phone]    Physical Therapy Treatment Note/ Progress Report:     Date:  2023    Patient Name:  Courtney Rivera    :  1955  MRN: [id number]    Medical/Treatment Diagnosis Information:  Diagnosis: H53.2 (ICD-10-CM) - Diplopia  I63.9 (ICD-10-CM) - Acute CVA (cerebrovascular accident)    R26 impaired gait and mobility due to dizziness, diplopia  Insurance/Certification information:  PT Insurance Information: Medicare  Physician Information:  Pilar Lin MD   Plan of care signed:    [x] Yes  [] No    Date of Patient follow up with Physician:      Progress Report: []  Yes  [x]  No     Date Range for reporting period:  Beginnin22  Ending:      Progress report due (10 Rx/or 30 days whichever is less): 11     Recertification due (POC duration/ or 90 days whichever is less):      Visit # Insurance Allowable Auth Needed    BMN []Yes   [x]No     RESTRICTIONS/PRECAUTIONS: diplopia  Latex Allergy:  [x]NO      []YES  Preferred Language for Healthcare:   [x]English       []other:  Functional Scale: SIS-16: 69% ability; FGA 20/30 ; 10m walk 0.90m/s  Date assessed:22    Pain level:  0/10   Dizziness: 1-2/10    SUBJECTIVE:  Pt c/o dizziness only w/ strenuous visual/cognitive activity. She has been walking 1 block for exercise. She required use of w/c in airport for travel. Pt no longer using eye patch due to diplopia resolved. Pt only using SPC as needed. Pt saw Dr. Gurpreet Montez yesterday, they are recommending sleep apnea test. She does have an appt w/ neuro optolmogist at [facility] on 23. OBJECTIVE: Pt did not schedule any visits since eval because she went to [facility] for 2 weeks. Pt 15 min. Late today due to traffic.      Observation: Pt amb w/ limited trunk/head rotation, decreased rom, much less veering, carries SPC in L hand   Test measurements: Postural Control Tests:  Clinical Test of Sensory Interaction for Balance (CTSIB) performed in Romberg stance  CONDITION TIME STRATEGY SWAY    Eyes open, firm surface 30      Eyes closed, firm surface 30  Mult, slightly anterior     Eyes open, foam surface 30      Eyes closed, foam surface 30 ankle anterior       Exercises/Interventions: Exercises in bold performed in department today. Items not bolded are carried forward from prior visits for continuity of the record. Exercise/Equipment Resistance/Repetitions HEP Other comments   Nustep L4, 8 min.  [] LE 10; UE 11  Increased tinnitus R> L 3/10 w/ increased speed past 70spm,    Marching 10 x 2 [x] Standing, UE support near-by, add resistance as able    Cervical AROM R/L rotation 20 sec x 2  [x] VC for postural alignment and reduced upper trap compensation    R/L sidebending 20 sec x 2 [x] VC for postural alignment and reduced upper trap compensation   Imaginary target  5x, horizontal [x] Pt w/ difficulty maintaining neutral alignment, rests in cervical extension at all times. Error in maintaining target both directions. Horizontal and vertical for HEP   Head-eye coordination next []      []      []      []      []      []      []      []      []      []      []      []      []    TA: educ provided to answer questions re: balance and head movement due to postural and cervical proprioceptive deficits. Written handout issued. Home Exercise Program:   Access Code: [email or web address]  URL: [email or web address]  Date: 01/17/2023  Prepared by: Julio César English  Exercises  Standing Marching - 1 x daily - 5 x weekly - 2-3 sets - 10 reps  Seated Cervical Rotation AROM - 1 x daily - 7 x weekly - 3 sets - 3 reps - 20 hold  Seated Cervical Sidebending AROM - 1 x daily - 7 x weekly - 3 reps - 20 hold  Imaginary target- horizontal and vertical, 5reps, 3x per day    Therapeutic Exercise:   [x] (14100) Provided verbal/tactile cueing for activities related to strengthening, flexibility, endurance, ROM for improvements in LE, proximal hip, and core control with self-care, mobility, lifting, ambulation. NMR:  [x] (32412) Provided verbal/tactile cueing for activities related to improving balance, coordination, kinesthetic sense, posture, motor skill, proprioception to assist with LE, proximal hip, and core control in self-care, mobility, lifting, ambulation and eccentric single leg control. Therapeutic Activities:    [x] (94191) Provided verbal/tactile cueing for activities related to improving balance, coordination, kinesthetic sense, posture, motor skill, proprioception and motor activation to allow for proper function of core, proximal hip and LE with self-care and ADLs and functional mobility. Gait Training:    [] (89568) Provided training and instruction to the patient for proper LE, core and proximal hip recruitment and positioning and eccentric body weight control with ambulation re-education including up and down stairs     Manual Treatments:  PROM / STM / Oscillations-Mobs:  G-I, II, III, IV (PA's, Inf., Post.)  [] (79601) Provided manual therapy to mobilize LE, proximal hip and/or LS spine soft tissue/joints for the purpose of modulating pain, promoting relaxation,  increasing ROM, reducing/eliminating soft tissue swelling/inflammation/restriction, improving soft tissue extensibility and allowing for proper ROM for normal function with self-care, mobility, lifting and ambulation.      Home Exercise Program:    [] (39523) Reviewed/Progressed HEP activities related to strengthening, flexibility, endurance, ROM of core, proximal hip and LE for functional self-care, mobility, lifting and ambulation/stair navigation   [x] (68972) Reviewed/Progressed HEP activities related to improving balance, coordination, kinesthetic sense, posture, motor skill, proprioception of core, proximal hip and LE for self-care, mobility, lifting, and ambulation/stair navigation      Modalities: [] Electric Stimulation:   [] Ultrasound:   [] Other:       Charges:  Timed Code Treatment Minutes:  TA 3; NM30; TE 12   Total Treatment Minutes: 45      [] EVAL (LOW) 94860 (typically 20 minutes face-to-face)  [] EVAL (MOD) 30694 (typically 30 minutes face-to-face)  [] EVAL (HIGH) 55075 (typically 45 minutes face-to-face)  [] RE-EVAL     [x] TL(45692) x   1    [x] NMR (55375) x   2    [] Manual (28682) x       [] TA (96607) x      [] Gait Training [phone] x       [] ES(attended) (22543)  [] ES (un) [phone])   [] DRY NEEDLE 1 OR 2 MUSCLES  [] DRY NEEDLE 3+ MUSCLES  [] Mech Traction (18264)  [] Ultrasound (73433)  [] Other:    GOALS: Goals established 12/19/22   Patient stated goal: to get rid of dizziness and walk independently  [] Progressing: [] Met: [] Not Met: [] Adjusted    Therapist goals for Patient:   Short Term Goals: To be achieved in: 3 weeks   1. Independent in HEP and progression per patient tolerance. [] Progressing: [] Met: [] Not Met: [] Adjusted   2. Pt will report dizziness and headache <3/10 to improve tolerance of functional mobility and tasks. [] Progressing: [] Met: [] Not Met: [] Adjusted  3. Pt will amb w/ SPC indoors mod I and outdoors supervision. [] Progressing: [] Met: [] Not Met: [] Adjusted  4. Pt will improve FGA to 23/30 to demonstrate reduced all risk. [] Progressing: [] Met: [] Not Met: [] Adjusted    Long Term Goals: To be achieved in: 6 weeks  1. Pt will improve SIS-16 score to 85% ability. [] Progressing: [] Met: [] Not Met: [] Adjusted  2. Pt will improve 10m walk speed to >=1.0m/s. [] Progressing: [] Met: [] Not Met: [] Adjusted  3. Pt will report dizziness/headache <1/10 to improve tolerance of functional mobility and tasks. [] Progressing: [] Met: [] Not Met: [] Adjusted  4. Pt will improve FGA to 27/30 to demonstrate reduced all risk. [] Progressing: [] Met: [] Not Met: [] Adjusted  5. Pt independent with HEP.   [] Progressing: [] Met: [] Not Met: [] Adjusted  6. Pt to report improved confidence with ambulating in community w/ AD as needed. [] Progressing: [] Met: [] Not Met: [] Adjusted    ASSESSMENT:  Pt w/ natural resolution of diplopia since eval nearly 4 weeks ago. She continues w/ dizziness, tinnitus and imbalance, but less severe than previously. Pt initiated cardio training today which did increase her tinnitus. Pt demos little minimal deficits on CTSIB. She does have deficits in cervical ROM and proprioception which will be addressed w/ HEP. Pt will continue to benefit from skilled PT to improve functional mobility, reduce fall risk and maximize independence. Treatment/Activity Tolerance:  [x] Patient tolerated treatment well [] Patient limited by fatique  [] Patient limited by pain  [] Patient limited by other medical complications  [] Other:     Overall Progression Towards Functional goals/ Treatment Progress Update:  [] Patient is progressing as expected towards functional goals listed. [] Progression is slowed due to complexities/Impairments listed. [] Progression has been slowed due to co-morbidities. [x] Plan just implemented, too soon to assess goals progression <30days   [] Goals require adjustment due to lack of progress  [] Patient is not progressing as expected and requires additional follow up with physician  [] Other    Prognosis for POC: [x] Good [] Fair  [] Poor    Patient requires continued skilled intervention: [x] Yes  [] No        PLAN: 2/week for 6 weeks  [x] Continue per plan of care [] Alter current plan (see comments)  [] Plan of care initiated [] Hold pending MD visit [] Discharge    Electronically signed by: Jairo Hayward, PT, DPT    Note: If patient does not return for scheduled/recommended follow up visits, this note will serve as a discharge from care along with the most recent update on progress.

## 2023-01-18 ENCOUNTER — HOSPITAL ENCOUNTER (OUTPATIENT)
Dept: PHYSICAL THERAPY | Age: 68
Setting detail: THERAPIES SERIES
Discharge: HOME | End: 2023-01-18
Payer: MEDICARE

## 2023-01-18 PROCEDURE — 97112 NEUROMUSCULAR REEDUCATION: CPT

## 2023-01-18 NOTE — PROGRESS NOTES
[facility]  [address] [place] [address], [address] 52, [address]  Phone: [phone]   Fax: [phone]    Physical Therapy Treatment Note/ Progress Report:     Date:  2023    Patient Name:  Sandra Amaro    :  1955  MRN: [id number]    Medical/Treatment Diagnosis Information:  Diagnosis: H53.2 (ICD-10-CM) - Diplopia  I63.9 (ICD-10-CM) - Acute CVA (cerebrovascular accident)    R26 impaired gait and mobility due to dizziness, diplopia  Insurance/Certification information:  PT Insurance Information: Medicare  Physician Information:  Natalie Harris MD   Plan of care signed:    [x] Yes  [] No    Date of Patient follow up with Physician:      Progress Report: [x]  Yes  []  No     Date Range for reporting period:  Beginnin22  Endin23    Progress report due (10 Rx/or 30 days whichever is less):      Recertification due (POC duration/ or 90 days whichever is less):      Visit # Insurance Allowable Auth Needed   3/12 BMN []Yes   [x]No     RESTRICTIONS/PRECAUTIONS: diplopia  Latex Allergy:  [x]NO      []YES  Preferred Language for Healthcare:   [x]English       []other:  Functional Scale: SIS-16: 69% ability; FGA 2030 ; 10m walk 0.90m/s  Date assessed:22    Functional Scale: SIS-16: 90% ability;   Date assessed:23      Pain level:  0/10   Dizziness: 0/10 sitting; turning head 4/10    SUBJECTIVE:  Pt reports she is using the [facility] more so with stairs and when standing still. Pt reports she hears crackling in her ears when she stretches- sidebending. OBJECTIVE:    Pt w/ limited visits since eval due to pt traveling to [place] and [place].    Observation: Pt amb w/ limited trunk/head rotation, decreased rom, much less veering, carries SPC in L hand   Test measurements:      Postural Control Tests:  Clinical Test of Sensory Interaction for Balance (CTSIB) performed in Romberg stance  CONDITION TIME STRATEGY SWAY    Eyes open, firm surface 30      Eyes closed, firm surface 30  Mult, slightly anterior     Eyes open, foam surface 30      Eyes closed, foam surface 30 ankle anterior       Exercises/Interventions: Exercises in bold performed in department today. Items not bolded are carried forward from prior visits for continuity of the record. Exercise/Equipment Resistance/Repetitions HEP Other comments   Nustep L4, 8 min.  [] LE 10; UE 11  Increased tinnitus R> L 3/10 w/ increased speed past 70spm,    Marching 10 x 2 [x] Standing, UE support near-by, add resistance as able    Cervical AROM R/L rotation 20 sec x 4 [x] VC for postural alignment and reduced upper trap compensation    R/L sidebending 20 sec x 4 [x] VC for postural alignment and reduced upper trap compensation   Imaginary target  10x, horizontal  10x, vertical [x] Pt w/ difficulty maintaining neutral alignment, rests in cervical extension at all times. Error in maintaining target both directions. Horizontal and vertical for HEP   Head-eye coordination 10x horizontal  10x vertical [x] Decreased speed and errors in degrees of R neck rotation. Difficulty w/ achieving sufficient cervical flexion. []    Pec stretch  20 sec  [x] Trialed standing in door way and supine w/ UE abducted off edge of mat for HEP     []      []      []    TA: extensive educ during exercises to correct for postural deviations. Pt maintains excessive cervical extension and demos poor awareness of where neutral alignment is or able to maintain it. Pt with questions and observations throughout sessions. Dynamic balance     Amb with head turns/ nods 40' x 2 Slight veering, no dizziness   Amb with 180* turns  X 8 No LOB, increased # steps   Amb with quick stop/starts     Sidestepping     Retrowalking     Amb w/ high knees  40' x 2 More LOB w/ time. Home Exercise Program:   Access Code: [email or web address]  URL: [email or web address]  Date: 01/17/2023  Prepared by: Edie Bergeron Meyrose  Exercises  Standing Marching - 1 x daily - 5 x weekly - 2-3 sets - 10 reps  Seated Cervical Rotation AROM - 1 x daily - 7 x weekly - 3 sets - 3 reps - 20 hold  Seated Cervical Sidebending AROM - 1 x daily - 7 x weekly - 3 reps - 20 hold  Imaginary target- horizontal and vertical, 5reps, 3x per day    Therapeutic Exercise:   [] (08536) Provided verbal/tactile cueing for activities related to strengthening, flexibility, endurance, ROM for improvements in LE, proximal hip, and core control with self-care, mobility, lifting, ambulation. NMR:  [x] (00759) Provided verbal/tactile cueing for activities related to improving balance, coordination, kinesthetic sense, posture, motor skill, proprioception to assist with LE, proximal hip, and core control in self-care, mobility, lifting, ambulation and eccentric single leg control. Therapeutic Activities:    [] (53880) Provided verbal/tactile cueing for activities related to improving balance, coordination, kinesthetic sense, posture, motor skill, proprioception and motor activation to allow for proper function of core, proximal hip and LE with self-care and ADLs and functional mobility. Gait Training:    [] (76576) Provided training and instruction to the patient for proper LE, core and proximal hip recruitment and positioning and eccentric body weight control with ambulation re-education including up and down stairs     Manual Treatments:  PROM / STM / Oscillations-Mobs:  G-I, II, III, IV (PA's, Inf., Post.)  [] (48713) Provided manual therapy to mobilize LE, proximal hip and/or LS spine soft tissue/joints for the purpose of modulating pain, promoting relaxation,  increasing ROM, reducing/eliminating soft tissue swelling/inflammation/restriction, improving soft tissue extensibility and allowing for proper ROM for normal function with self-care, mobility, lifting and ambulation.      Home Exercise Program:    [] (51172) Reviewed/Progressed HEP activities related to strengthening, flexibility, endurance, ROM of core, proximal hip and LE for functional self-care, mobility, lifting and ambulation/stair navigation   [x] (45503) Reviewed/Progressed HEP activities related to improving balance, coordination, kinesthetic sense, posture, motor skill, proprioception of core, proximal hip and LE for self-care, mobility, lifting, and ambulation/stair navigation      Modalities:    [] Electric Stimulation:   [] Ultrasound:   [] Other:       Charges:  Timed Code Treatment Minutes: NM 60   Total Treatment Minutes: 60      [] EVAL (LOW) 70729 (typically 20 minutes face-to-face)  [] EVAL (MOD) 51358 (typically 30 minutes face-to-face)  [] EVAL (HIGH) 31026 (typically 45 minutes face-to-face)  [] RE-EVAL     [] PN(97771) x       [x] NMR (47838) x   4    [] Manual (40657) x       [] TA (46891) x      [] Gait Training [phone] x       [] ES(attended) (11489)  [] ES (un) (73287)   [] DRY NEEDLE 1 OR 2 MUSCLES  [] DRY NEEDLE 3+ MUSCLES  [] Mech Traction (13638)  [] Ultrasound (78392)  [] Other:    GOALS: Goals established 12/19/22   Patient stated goal: to get rid of dizziness and walk independently  [x] Progressing: [] Met: [] Not Met: [] Adjusted    Therapist goals for Patient:   Short Term Goals: To be achieved in: 3 weeks   1. Independent in HEP and progression per patient tolerance. [] Progressing: [x] Met: [] Not Met: [] Adjusted   2. Pt will report dizziness and headache <3/10 to improve tolerance of functional mobility and tasks. Can intermittently go up to 4/10, but mostly <3/10  [] Progressing: [x] Met: [] Not Met: [] Adjusted  3. Pt will amb w/ SPC indoors mod I and outdoors supervision. [] Progressing: [x] Met: [] Not Met: [] Adjusted  4. Pt will improve FGA to 23/30 to demonstrate reduced all risk. NT today  [x] Progressing: [] Met: [] Not Met: [] Adjusted    Long Term Goals: To be achieved in: 6 weeks  1. Pt will improve SIS-16 score to 85% ability.  90%  [] Progressing: [x] Met: [] Not Met: [] Adjusted  2. Pt will improve 10m walk speed to >=1.0m/s. [] Progressing: [] Met: [] Not Met: [] Adjusted  3. Pt will report dizziness/headache <1/10 to improve tolerance of functional mobility and tasks. [] Progressing: [] Met: [] Not Met: [] Adjusted  4. Pt will improve FGA to 27/30 to demonstrate reduced all risk. [] Progressing: [] Met: [] Not Met: [] Adjusted  5. Pt independent with HEP. [] Progressing: [] Met: [] Not Met: [] Adjusted  6. Pt to report improved confidence with ambulating in community w/ AD as needed. [] Progressing: [] Met: [] Not Met: [] Adjusted    ASSESSMENT:  Pt met 3/4 ST goals. Pt demonstrates significant progression since eval despite only attending 3 sessions. She had natural resolution of diplopia and her sensitivity to stimulation (noise and visual) has significantly improved. Pt is progressing gaze stabilization and working on postural training w/ emphasis on proprioception and head-eye coordination. Pt is currently using SPC for all mobility (walls inside home mostly) mod I. She does continue w/ fluctuating headaches and tinnitus w/ exertion. Pt will continue to benefit from skilled PT to improve functional mobility, reduce fall risk and maximize independence. Treatment/Activity Tolerance:  [x] Patient tolerated treatment well [] Patient limited by fatique  [] Patient limited by pain  [] Patient limited by other medical complications  [] Other:     Overall Progression Towards Functional goals/ Treatment Progress Update:  [x] Patient is progressing as expected towards functional goals listed. [] Progression is slowed due to complexities/Impairments listed. [] Progression has been slowed due to co-morbidities.   [] Plan just implemented, too soon to assess goals progression <30days   [] Goals require adjustment due to lack of progress  [] Patient is not progressing as expected and requires additional follow up with physician  [] Other    Prognosis for POC: [x] Good [] Fair  [] Poor    Patient requires continued skilled intervention: [x] Yes  [] No        PLAN: 2/week for 6 weeks  [x] Continue per plan of care [] Alter current plan (see comments)  [] Plan of care initiated [] Hold pending MD visit [] Discharge    Electronically signed by: Avila Fontaine, PT, DPT    Note: If patient does not return for scheduled/recommended follow up visits, this note will serve as a discharge from care along with the most recent update on progress.

## 2023-01-19 ENCOUNTER — HOSPITAL ENCOUNTER (OUTPATIENT)
Dept: OCCUPATIONAL THERAPY | Age: 68
Setting detail: THERAPIES SERIES
Discharge: HOME | End: 2023-01-19
Payer: MEDICARE

## 2023-01-19 PROCEDURE — 97750 PHYSICAL PERFORMANCE TEST: CPT

## 2023-01-19 PROCEDURE — 97530 THERAPEUTIC ACTIVITIES: CPT

## 2023-01-19 NOTE — FLOWSHEET NOTE
[facility] Outpatient Therapy  [address] [email or web address], [address] [place] [address]  Phone: [phone]   Fax: [phone]    Occupational Therapy Treatment Note/ Progress Report:     Date:  2023     Patient Name:  Toribio Daniels    :  1955  MRN: [id number]      Medical Diagnosis Information:  Diplopia [H53.2]  Cerebral infarction, unspecified [I63.9]   Treatment Diagnosis Information:  Z73.6 (ICD-10-CM) Limitation of activities due to disability    Insurance/Certification information: Medicare  Physician Information:  Chaparro Zhou MD   Plan of care signed:    No    Date of Patient follow up with Physician: Neuro-opthalmologist: 2022     Progress Report: []  Yes  [x]  No     Date Range for reporting period:  Beginnin2022  Ending:      Progress report due:      Recertification due (POC duration/ or 90 days whichever is less): 2022     Visit # Insurance Allowable Auth Needed     BMN No     Latex Allergy:  [x]NO      []YES  Preferred Language for Healthcare:   [x]English       []other:    Pain level:  0/10     SUBJECTIVE: Pt reported difficulty focusing in noisy environment at dinner last night while at a restaurant. Pt compliant with homework and reported coloring activity to be more difficult than anticipated. Pt reported she has not attempted to put groceries away or carry laundry up the stairs although these are tasks she would like to be independent in. Pt reported stamina and low stimulation threshold cont to be barriers. OBJECTIVE:   Observation: impaired fine motor control of R hand  Test measurements:      Functional Scale:   Score: 75/100 (2023)    RESTRICTIONS/PRECAUTIONS: fall risk    Therapeutic Activities:    Activity #1: Pt instructed to copy sentence for evaluation of tolerance for prolong writing. Pt wrote in preferred style of cursive on unlined paper.  Pt reported quality of handwriting to be at baseline but reported pen to feel as if it was \"slipping\" at 2 minutes and 22 seconds. Pt also reported her hand was tightening up. Pt stated the ringing in her ears was \"bad\", \"5/10\" and denied HA. Terminated activity at 2 minutes 22 seconds. Activity #2: Pt in stance to \"make bed\" requiring her to place a flat sheet and blanket on mat table and putting a pillowcase on pillow and placing on bed. Pt completed with SBA. Pt then instructed to fold sheets, blankets and pillowcase. Upon completion, pt stated ringing in ears to be 6/10 and fatigued requesting to sit down (2 minutes 30 seconds). Pt required a prolong seated rest break (5 minutes) and then required a supine rest break (~3 minutes). Activity #3: Pt instructed to identify and place game pieces from \"Perfection\" with R hand with vision occluded for sensory processing assessment and fine motor control. Pt demo'd impulsivity initially requiring min VCs to take time to identify. Number of incorrect 9/22, number of correct 13/22. Pt stated ringing in ears to be 2/10 (which was also the end of the session). Patient Education:   Purpose of TAs - verb understanding  Progress made - verb understanding  Role of OT with return to driving - verb understanding    Therapeutic Exercises: Exercises in bold performed in department today. Items not bolded are carried forward from prior visits for continuity of the record.     Exercise/Equipment Resistance/Repetitions HEP Other comments   Bilateral MP flexion (with DIP/PIP extension)   10 reps [x] Increased difficulty requiring mod-max VCs and modeling       []        []        []        []        []        []        []        []          []         []        []        []        []        []        []        []        []      Home Exercise Program:  Bilateral MP flexion (with DIP/PIP extension)  2 sets x1-2 minutes daily    Therapeutic Exercise:   [] (56066) Provided verbal/tactile cueing for activities related to strengthening, flexibility, endurance, ROM. Includes review/progression of HEP activities related to strengthening, flexibility, endurance, AROM, proximal shoulder and UE for functional transfers/mobility, self-care, IADLs, and community re-entry    Therapeutic Activities:    [x] (97273) Provided verbal/tactile cueing for activities related to improving balance, coordination, kinesthetic sense, posture, motor skill, proprioception and motor activation to allow for proper function of core, proximal shoulder and UE with self-care, and ADLs, IADLs, functional mobility, work-related and leisure based activities. Includes review/progression of HEP activities to improve balance, coordination, kinesthetic sense, posture, motor skill, proprioception, proximal shoulder and UE for functional transfers/mobility, self-care, IADLs, and community re-entry    Sensory Integration Techniques:  [] (69601)Provided verbal/tactile feedback to enhance sensory processing and promoting responses that are adaptive to environmental demands    Self-Care/Home Management Training:  [] (61478) Provided training and education in restorative and compensatory strategies/activity modifications in activities of daily living, meal preparation, laundry and other various house maintenance activities. Provided training and education in use of adaptive equipment/devices and assistive technology, as needed, to promote participation and independence.    Cognitive Function:  [] (97129 x15 minutes, 97238 +15 minutes) Integrated activities that address attention, memory, reasoning, executive functioning, problem solving, and/or pragmatic functioning in addition to compensatory strategies to manage the performance of an activity (for example, managing time or schedules, initiating, organizing, and sequencing tasks).    NMR:  [] (02978) During functional activities/therapeutic exercises, provided facilitation of normal movement patterns and provided handling/verbal cues to  promote postural alignment and stability during static and dynamic movement (sitting or standing). Activities may also include visual perceptual training, proprioception training, and balance retraining during functional activities    Manual Treatments:    [] (42434) Provided manual therapy to mobilize UB for the purpose of modulating pain, promoting relaxation,  increasing ROM, reducing/eliminating soft tissue swelling/inflammation/restriction, improving soft tissue extensibility and allowing for proper ROM for normal function with self-care, functional mobility, transfers, reaching and lifting    Modalities:     [] Electrical Stimulation (47492):     [] Ultrasound (68667):    Charges:  Timed Code Treatment Minutes: 55   Total Treatment Minutes: 55      [] EVAL (LOW) 89817 (typically 20 minutes face-to-face)  [] EVAL (MOD) 40657 (typically 30 minutes face-to-face)  [] EVAL (HIGH) 58365 (typically 45 minutes face-to-face)  [] RE-EVAL     [] ZZ(53063) x      [] NMR (57712) x       [] Manual ([phone]) x       [x] TA (53851) x45 (3)   [] ES(attended) (36540)       [] ES (un) (68462)  [x] Other: Phy Per Test (18067)    GOALS:   Patient stated goal: return to driving, increase mobility, no ringing in ears, and improve vision  [] Progressing: [] Met: [] Not Met: [] Adjusted     Therapist goals for Patient:      Short Term Goals: To be achieved in: 4 weeks  1. Pt will improve score on UEFS to 75/100 for a subjective report of decreased difficulty with completing every day activities. [] Progressing: [x] Met: 1/19/2023 [] Not Met: [] Adjusted  2. Pt will write for 1 minute in cursive style with 75% accuracy to demo improved tolerance with fine motor activities suggesting improved strength of intrinsic muscles of R hand. [] Progressing: [x] Met: [] Not Met: [] Adjusted  3. Pt will complete 1 IADL task per pt preference in stance x3 minutes to improve stamina with vision and standing activities to facilitate return to PLOF. [x] Progressing: [] Met: [] Not Met: [] Adjusted  4. Pt will be Min A with HEP/Home activities program to facilitate independence with carryover from sessions and to progress towards returning to PLOF  [] Progressing: [x] Met: [] Not Met: [] Adjusted     Long Term Goals: To be achieved in: 8 weeks  1. Pt will improve score on UEFS to 90/100 for a subjective report of decreased difficulty with completing every day activities. [x] Progressing: [] Met: [] Not Met: [] Adjusted  2. Pt will write for 3 minutes in cursive style with 90% accuracy to demo improved tolerance with fine motor activities suggesting improved strength of intrinsic muscles of R hand. [x] Progressing: [] Met: [] Not Met: [] Adjusted  3. Pt will complete 1 IADL task per pt preference in stance x5 minutes to improve stamina with vision and standing activities to facilitate return to PLOF. [x] Progressing: [] Met: [] Not Met: [] Adjusted  4. Pt will be Independent with HEP/Home activities program to facilitate independence with carryover from sessions and to progress towards returning to PLOF  [x] Progressing: [] Met: [] Not Met: [] Adjusted    ASSESSMENT:    Pt met 3/4 STGs and is demonstrating progress towards 1 other goal. Pt is demo'ing improved fine motor control of R hand, although continue to fatigue easily during functional activities. Pt cont to benefit from continuation of POC for further goal progression to facilitate return to PLOF. Cont per POC. Treatment/Activity Tolerance:  [x] Patient tolerated treatment well [] Patient limited by fatique  [] Patient limited by pain  [] Patient limited by other medical complications  [] Other:     Overall Progression Towards Functional goals/ Treatment Progress Update:  [x] Patient is progressing as expected towards functional goals listed. [] Progression is slowed due to complexities/Impairments listed. [] Progression has been slowed due to co-morbidities.   [] Plan just implemented, too soon to assess goals progression <30days   [] Goals require adjustment due to lack of progress  [] Patient is not progressing as expected and requires additional follow up with physician  [] Other    Prognosis for POC: [x] Good [] Fair  [] Poor    Patient requires continued skilled intervention: [x] Yes  [] No        PLAN:   [x] Continue per plan of care [] Alter current plan (see comments)  [] Plan of care initiated [] Hold pending MD visit [] Discharge    Electronically signed by: iNtin Yoder OT    Note: If patient does not return for scheduled/recommended follow up visits, this note will serve as a discharge from care along with the most recent update on progress.

## 2023-01-23 ENCOUNTER — HOSPITAL ENCOUNTER (OUTPATIENT)
Dept: PHYSICAL THERAPY | Age: 68
Setting detail: THERAPIES SERIES
Discharge: HOME | End: 2023-01-23
Payer: MEDICARE

## 2023-01-23 ENCOUNTER — APPOINTMENT (OUTPATIENT)
Dept: OCCUPATIONAL THERAPY | Age: 68
End: 2023-01-23
Payer: MEDICARE

## 2023-01-23 PROCEDURE — 97112 NEUROMUSCULAR REEDUCATION: CPT

## 2023-01-23 NOTE — FLOWSHEET NOTE
[facility] Outpatient Therapy  [address] [place] [address] [address], [address]  Phone: [phone]   Fax: [phone]    Physical Therapy Treatment Note/ Progress Report:     Date:  2023    Patient Name:  Amado Cummings    :  1955  MRN: [id number]    Medical/Treatment Diagnosis Information:  Diagnosis: H53.2 (ICD-10-CM) - Diplopia  I63.9 (ICD-10-CM) - Acute CVA (cerebrovascular accident)    R26 impaired gait and mobility due to dizziness, diplopia  Insurance/Certification information:  PT Insurance Information: Medicare  Physician Information:  Rupesh Perla MD   Plan of care signed:    [x] Yes  [] No    Date of Patient follow up with Physician:      Progress Report: [x]  Yes  []  No     Date Range for reporting period:  Beginnin22  Endin23    Progress report due (10 Rx/or 30 days whichever is less): [birth year]     Recertification due (POC duration/ or 90 days whichever is less):      Visit # Insurance Allowable Auth Needed    BMN []Yes   [x]No     RESTRICTIONS/PRECAUTIONS: diplopia  Latex Allergy:  [x]NO      []YES  Preferred Language for Healthcare:   [x]English       []other:  Functional Scale: SIS-16: 69% ability; FGA  ; 10m walk 0.90m/s  Date assessed:22    Functional Scale: SIS-16: 90% ability;   Date assessed:23      Pain level:  5/10 neck soreness  Dizziness: 4/10 ringing in B ear    SUBJECTIVE:  Pt reports she is having neck soreness from the exercises. Pt notes doing the exercises in bed has helped but it is still sore. Pt notes the soreness makes it difficult to turn her head from side to side and will end up turning her body. Pt notes that the ringing symptoms increases when bending over, and when making the bed the other day it got bad enough she needed to lay. OBJECTIVE:    Pt w/ limited visits since eval due to pt traveling to [place] and [place].    Observation: Pt amb w/ limited trunk/head rotation, decreased rom, much less veering, carries SPC in L hand   Test measurements:      Postural Control Tests:  Clinical Test of Sensory Interaction for Balance (CTSIB) performed in Romberg stance  CONDITION TIME STRATEGY SWAY    Eyes open, firm surface 30      Eyes closed, firm surface 30  Mult, slightly anterior     Eyes open, foam surface 30      Eyes closed, foam surface 30 ankle anterior       Exercises/Interventions: Exercises in bold performed in department today. Items not bolded are carried forward from prior visits for continuity of the record. Exercise/Equipment Resistance/Repetitions HEP Other comments   Nustep L4, 8 min.  [] LE 10; UE 11  Increased tinnitus R> L 6/10 w/ increased speed past 70spm,    Marching 10 x 1 w/o weight  20x w/ 2.5 lb weight [x] Standing, UE support near-by, 2.5 lb weights added to BLE    Cervical AROM R/L rotation 20 sec x 4 [x] VC for postural alignment and reduced upper trap compensation    R/L sidebending 20 sec x 4 [x] VC for postural alignment and reduced upper trap compensation   Imaginary target  10x, horizontal  10x, vertical [x] Pt w/ difficulty maintaining neutral alignment, rests in cervical extension at all times. Error in maintaining target both directions. Horizontal and vertical for HEP   Head-eye coordination 10x horizontal  10x vertical [x] Decreased speed and errors in degrees of R neck rotation. Difficulty w/ achieving sufficient cervical flexion. []    Pec stretch  20 sec  [x] Trialed standing in door way and supine w/ UE abducted off edge of mat for HEP     []      []      []    TA: extensive educ during exercises to correct for postural deviations. Pt maintains excessive cervical extension and demos poor awareness of where neutral alignment is or able to maintain it. Pt with questions and observations throughout sessions.         Dynamic balance     Amb with head turns/ nods 40' x 2 head turns  40' x 2 nodding up/down Slight veering to L with nodding up, no dizziness   Amb with 180* turns  X 8 No LOB, increased # steps   Amb with quick stop/starts 20' x 2    Sidestepping 20' x 2    Retrowalking     Amb w/ high knees  40' x 2 More LOB w/ time. Home Exercise Program:   Access Code: [email or web address]  URL: [email or web address]  Date: 01/17/2023  Prepared by: Claudia Starkey  Exercises  Standing Marching - 1 x daily - 5 x weekly - 2-3 sets - 10 reps  Seated Cervical Rotation AROM - 1 x daily - 7 x weekly - 3 sets - 3 reps - 20 hold  Seated Cervical Sidebending AROM - 1 x daily - 7 x weekly - 3 reps - 20 hold  Imaginary target- horizontal and vertical, 5reps, 3x per day    Therapeutic Exercise:   [] (31585) Provided verbal/tactile cueing for activities related to strengthening, flexibility, endurance, ROM for improvements in LE, proximal hip, and core control with self-care, mobility, lifting, ambulation. NMR:  [x] (09269) Provided verbal/tactile cueing for activities related to improving balance, coordination, kinesthetic sense, posture, motor skill, proprioception to assist with LE, proximal hip, and core control in self-care, mobility, lifting, ambulation and eccentric single leg control. Therapeutic Activities:    [] (95725) Provided verbal/tactile cueing for activities related to improving balance, coordination, kinesthetic sense, posture, motor skill, proprioception and motor activation to allow for proper function of core, proximal hip and LE with self-care and ADLs and functional mobility.      Gait Training:    [] (45554) Provided training and instruction to the patient for proper LE, core and proximal hip recruitment and positioning and eccentric body weight control with ambulation re-education including up and down stairs     Manual Treatments:  PROM / STM / Oscillations-Mobs:  G-I, II, III, IV (PA's, Inf., Post.)  [] (47651) Provided manual therapy to mobilize LE, proximal hip and/or LS spine soft tissue/joints for the purpose of modulating pain, promoting relaxation,  increasing ROM, reducing/eliminating soft tissue swelling/inflammation/restriction, improving soft tissue extensibility and allowing for proper ROM for normal function with self-care, mobility, lifting and ambulation. Home Exercise Program:    [] (21218) Reviewed/Progressed HEP activities related to strengthening, flexibility, endurance, ROM of core, proximal hip and LE for functional self-care, mobility, lifting and ambulation/stair navigation   [x] (79467) Reviewed/Progressed HEP activities related to improving balance, coordination, kinesthetic sense, posture, motor skill, proprioception of core, proximal hip and LE for self-care, mobility, lifting, and ambulation/stair navigation      Modalities:    [] Electric Stimulation:   [] Ultrasound:   [] Other:       Charges:  Timed Code Treatment Minutes: NM 58   Total Treatment Minutes: 58      [] EVAL (LOW) 17630 (typically 20 minutes face-to-face)  [] EVAL (MOD) 53884 (typically 30 minutes face-to-face)  [] EVAL (HIGH) 49591 (typically 45 minutes face-to-face)  [] RE-EVAL     [] PL(83489) x       [x] NMR (01436) x   4    [] Manual (46972) x       [] TA (09590) x      [] Gait Training [phone] x       [] ES(attended) (65469)  [] ES (un) (08193)   [] DRY NEEDLE 1 OR 2 MUSCLES  [] DRY NEEDLE 3+ MUSCLES  [] Mech Traction (70292)  [] Ultrasound (15571)  [] Other:    GOALS: Goals established 12/19/22   Patient stated goal: to get rid of dizziness and walk independently  [x] Progressing: [] Met: [] Not Met: [] Adjusted    Therapist goals for Patient:   Short Term Goals: To be achieved in: 3 weeks   1. Independent in HEP and progression per patient tolerance. [] Progressing: [x] Met: [] Not Met: [] Adjusted   2. Pt will report dizziness and headache <3/10 to improve tolerance of functional mobility and tasks. Can intermittently go up to 4/10, but mostly <3/10  [] Progressing: [x] Met: [] Not Met: [] Adjusted  3.  Pt will amb w/ SPC indoors mod I and outdoors supervision. [] Progressing: [x] Met: [] Not Met: [] Adjusted  4. Pt will improve FGA to 23/30 to demonstrate reduced all risk. NT today  [x] Progressing: [] Met: [] Not Met: [] Adjusted    Long Term Goals: To be achieved in: 6 weeks  1. Pt will improve SIS-16 score to 85% ability. 90%  [] Progressing: [x] Met: [] Not Met: [] Adjusted  2. Pt will improve 10m walk speed to >=1.0m/s. [] Progressing: [] Met: [] Not Met: [] Adjusted  3. Pt will report dizziness/headache <1/10 to improve tolerance of functional mobility and tasks. [] Progressing: [] Met: [] Not Met: [] Adjusted  4. Pt will improve FGA to 27/30 to demonstrate reduced all risk. [] Progressing: [] Met: [] Not Met: [] Adjusted  5. Pt independent with HEP. [] Progressing: [] Met: [] Not Met: [] Adjusted  6. Pt to report improved confidence with ambulating in community w/ AD as needed. [] Progressing: [] Met: [] Not Met: [] Adjusted    ASSESSMENT:   Pt requires consistent verbal cueing to maintain decreased cervical extension posture. Pt demonstrates improved cervical proprioception throughout target and head-eye coordination activities. Pt demonstrates limitations throughout dynamic balance exercises with mild loss of balance with head movement left and right and up and down. Pt requires intermittent rest breaks due to increase in symptoms. Pt notes a slight headache started towards the end of session rated at a 2/10 and ringing in ears rated a 3-4/10. Pt will continue to benefit from skilled PT to improve functional mobility, reduce fall risk and maximize independence.        Treatment/Activity Tolerance:  [x] Patient tolerated treatment well [] Patient limited by fatique  [] Patient limited by pain  [] Patient limited by other medical complications  [x] Other: patient limited by symptoms (ringing in ears/headache)    Overall Progression Towards Functional goals/ Treatment Progress Update:  [x] Patient is progressing as expected towards functional goals listed. [] Progression is slowed due to complexities/Impairments listed. [] Progression has been slowed due to co-morbidities. [] Plan just implemented, too soon to assess goals progression <30days   [] Goals require adjustment due to lack of progress  [] Patient is not progressing as expected and requires additional follow up with physician  [] Other    Prognosis for POC: [x] Good [] Fair  [] Poor    Patient requires continued skilled intervention: [x] Yes  [] No        PLAN: 2/week for 6 weeks  [x] Continue per plan of care [] Alter current plan (see comments)  [] Plan of care initiated [] Hold pending MD visit [] Discharge    Electronically signed by: BRAXTON Ureña    Note: If patient does not return for scheduled/recommended follow up visits, this note will serve as a discharge from care along with the most recent update on progress.

## 2023-01-25 ENCOUNTER — HOSPITAL ENCOUNTER (OUTPATIENT)
Dept: PHYSICAL THERAPY | Age: 68
Setting detail: THERAPIES SERIES
Discharge: HOME | End: 2023-01-25
Payer: MEDICARE

## 2023-01-25 ENCOUNTER — APPOINTMENT (OUTPATIENT)
Dept: OCCUPATIONAL THERAPY | Age: 68
End: 2023-01-25
Payer: MEDICARE

## 2023-01-25 NOTE — CARE COORDINATION
[facility] Outpatient Therapy  [address], [address]  Phone: [phone]   Fax: [phone]    Physical Therapy Missed Visit Note     Date:  2023    Patient Name:  Celia Alegre      :  1955    MRN: [id number]      Cancelled visits to date: 3- 23  No-shows to date: 0    For today's appointment patient:  [x]  Cancelled  []  Rescheduled appointment  []  No-show     Reason given by patient:  []  Patient ill  []  Conflicting appointment  []  No transportation    []  Conflict with work  []  No reason given  [x]  Other:     Comments:  Clinic closed due to inclement weather. LVM to offer pt later time, no return call.      Electronically signed by:  Jann Laughlin, PT, DPT

## 2023-01-30 ENCOUNTER — HOSPITAL ENCOUNTER (OUTPATIENT)
Dept: OCCUPATIONAL THERAPY | Age: 68
Setting detail: THERAPIES SERIES
End: 2023-01-30
Payer: MEDICARE

## 2023-02-01 ENCOUNTER — HOSPITAL ENCOUNTER (OUTPATIENT)
Dept: OCCUPATIONAL THERAPY | Age: 68
Setting detail: THERAPIES SERIES
Discharge: HOME | End: 2023-02-01
Payer: MEDICARE

## 2023-02-01 ENCOUNTER — HOSPITAL ENCOUNTER (OUTPATIENT)
Dept: PHYSICAL THERAPY | Age: 68
Setting detail: THERAPIES SERIES
Discharge: HOME | End: 2023-02-01
Payer: MEDICARE

## 2023-02-01 PROCEDURE — 97110 THERAPEUTIC EXERCISES: CPT

## 2023-02-01 PROCEDURE — 97530 THERAPEUTIC ACTIVITIES: CPT

## 2023-02-01 PROCEDURE — 97750 PHYSICAL PERFORMANCE TEST: CPT

## 2023-02-01 PROCEDURE — 97112 NEUROMUSCULAR REEDUCATION: CPT

## 2023-02-01 NOTE — FLOWSHEET NOTE
[facility] Outpatient Therapy  [address], [place] [address], [address]  Phone: [phone]   Fax: [phone]    Physical Therapy Treatment Note/ Progress Report:     Date:  2023    Patient Name:  Anastasiia Cardona    :  1955  MRN: [id number]    Medical/Treatment Diagnosis Information:  Diagnosis: H53.2 (ICD-10-CM) - Diplopia  I63.9 (ICD-10-CM) - Acute CVA (cerebrovascular accident)    R26 impaired gait and mobility due to dizziness, diplopia  Insurance/Certification information:  PT Insurance Information: Medicare  Physician Information:  Bola Bermudez MD   Plan of care signed:    [x] Yes  [] No    Date of Patient follow up with Physician:      Progress Report: []  Yes  [x]  No     Date Range for reporting period:  Beginnin22  Endin23    Progress report due (10 Rx/or 30 days whichever is less):      Recertification due (POC duration/ or 90 days whichever is less):      Visit # Insurance Allowable Auth Needed    BMN []Yes   [x]No     RESTRICTIONS/PRECAUTIONS: diplopia  Latex Allergy:  [x]NO      []YES  Preferred Language for Healthcare:   [x]English       []other:  Functional Scale: SIS-16: 69% ability; FGA  ; 10m walk 0.90m/s  Date assessed:22    Functional Scale: SIS-16: 90% ability;   Date assessed:23  Functional Scale: FGA   Date assessed:23    Pain level:  0/10 neck soreness  Dizziness: 0/10       SUBJECTIVE:  Pt reports she went to the eye doctor on Fri and they cleared her to drive. She drove today without difficulty. She reports no dizziness, although did have some tinnitus when at a loud  yesterday. Pt feels her dizziness is better, she just needs to work on endurance. OBJECTIVE:    Observation: Pt amb w/ slightly limited trunk/head rotation, improved rom, no veering   Test measurements:            Exercises/Interventions: Exercises in bold performed in department today.   Items not bolded are carried forward from prior visits for continuity of the record. Exercise/Equipment Resistance/Repetitions HEP Other comments   Nustep L4, 8 min.  [] LE 10; UE 11  Increased tinnitus R> L 6/10 w/ increased speed past 70spm,    Marching 20x w/ 2.5 lb weight [x] Standing, UE support near-by   Cervical AROM R/L rotation 20 sec x 4 [x] VC for postural alignment and reduced upper trap compensation    R/L sidebending 20 sec x 4 [x] VC for postural alignment and reduced upper trap compensation   Imaginary target  10x, horizontal  10x, vertical [x] Pt w/ difficulty maintaining neutral alignment, rests in cervical extension at all times. Error in maintaining target both directions. Horizontal and vertical for HEP   Head-eye coordination 10x horizontal  10x vertical [x] Decreased speed and errors in degrees of R neck rotation. Difficulty w/ achieving sufficient cervical flexion. []    Pec stretch  20 sec  [x] Trialed standing in door way and supine w/ UE abducted off edge of mat for HEP     []      []      []    TA: reviewed pt's progress to date, ongoing goals. Pt feels she is doing much better, just needs to improve general activity endurance. Education provided on 2nd stroke prevention with emphasis on increasing physical activity level. Recommended 30 min. X 5 days per week at moderate intensity per AHA guidelines. Pt verbalized understanding. Dynamic balance     Amb with head turns/ nods 85' x 4 each Slight veering to L with turning, no dizziness or LOB   Amb with 180* turns  X10 No LOB, normal speed   Amb with quick stop/starts w/ forward/backwards walk X 12 No LOB   Sidestepping 40' x 1 w/ B UE abd/add    Retrowalking 40'    Amb w/ high knees + big arms 85' x 2 No LOB            Home Exercise Program:   Access Code: [email or web address]  URL: [email or web address]  Date: 01/17/2023  Prepared by: Nazia Cortez  Exercises  Standing Marching - 1 x daily - 5 x weekly - 2-3 sets - 10 reps  Seated Cervical Rotation AROM - 1 x daily - 7 x weekly - 3 sets - 3 reps - 20 hold  Seated Cervical Sidebending AROM - 1 x daily - 7 x weekly - 3 reps - 20 hold  Imaginary target- horizontal and vertical, 5reps, 3x per day    Therapeutic Exercise:   [x] (95254) Provided verbal/tactile cueing for activities related to strengthening, flexibility, endurance, ROM for improvements in LE, proximal hip, and core control with self-care, mobility, lifting, ambulation. NMR:  [x] (14339) Provided verbal/tactile cueing for activities related to improving balance, coordination, kinesthetic sense, posture, motor skill, proprioception to assist with LE, proximal hip, and core control in self-care, mobility, lifting, ambulation and eccentric single leg control. Therapeutic Activities:    [] (08309) Provided verbal/tactile cueing for activities related to improving balance, coordination, kinesthetic sense, posture, motor skill, proprioception and motor activation to allow for proper function of core, proximal hip and LE with self-care and ADLs and functional mobility. Gait Training:    [] (13117) Provided training and instruction to the patient for proper LE, core and proximal hip recruitment and positioning and eccentric body weight control with ambulation re-education including up and down stairs     Manual Treatments:  PROM / STM / Oscillations-Mobs:  G-I, II, III, IV (PA's, Inf., Post.)  [] (92818) Provided manual therapy to mobilize LE, proximal hip and/or LS spine soft tissue/joints for the purpose of modulating pain, promoting relaxation,  increasing ROM, reducing/eliminating soft tissue swelling/inflammation/restriction, improving soft tissue extensibility and allowing for proper ROM for normal function with self-care, mobility, lifting and ambulation.      Home Exercise Program:    [] (28371) Reviewed/Progressed HEP activities related to strengthening, flexibility, endurance, ROM of core, proximal hip and LE for functional self-care, mobility, lifting and ambulation/stair navigation   [x] (55700) Reviewed/Progressed HEP activities related to improving balance, coordination, kinesthetic sense, posture, motor skill, proprioception of core, proximal hip and LE for self-care, mobility, lifting, and ambulation/stair navigation      Modalities:    [] Electric Stimulation:   [] Ultrasound:   [] Other:       Charges:  Timed Code Treatment Minutes: NM 45; TE 10   Total Treatment Minutes: 55      [] EVAL (LOW) 85379 (typically 20 minutes face-to-face)  [] EVAL (MOD) 33608 (typically 30 minutes face-to-face)  [] EVAL (HIGH) 82354 (typically 45 minutes face-to-face)  [] RE-EVAL     [x] QX(93573) x   1    [x] NMR (48614) x   3   [] Manual (22585) x       [] TA (03629) x      [] Gait Training [phone] x       [] ES(attended) (00466)  [] ES (un) (08178)   [] DRY NEEDLE 1 OR 2 MUSCLES  [] DRY NEEDLE 3+ MUSCLES  [] Mech Traction (07674)  [] Ultrasound (52507)  [] Other:    GOALS: Goals established 12/19/22   Patient stated goal: to get rid of dizziness and walk independently  [x] Progressing: [] Met: [] Not Met: [] Adjusted    Therapist goals for Patient:   Short Term Goals: To be achieved in: 3 weeks   1. Independent in HEP and progression per patient tolerance. [] Progressing: [x] Met: [] Not Met: [] Adjusted   2. Pt will report dizziness and headache <3/10 to improve tolerance of functional mobility and tasks. Can intermittently go up to 4/10, but mostly <3/10  [] Progressing: [x] Met: [] Not Met: [] Adjusted  3. Pt will amb w/ SPC indoors mod I and outdoors supervision. [] Progressing: [x] Met: [] Not Met: [] Adjusted  4. Pt will improve FGA to 23/30 to demonstrate reduced all risk. NT today  [x] Progressing: [] Met: [] Not Met: [] Adjusted    Long Term Goals: To be achieved in: 6 weeks  1. Pt will improve SIS-16 score to 85% ability. 90%  [] Progressing: [x] Met: [] Not Met: [] Adjusted  2.  Pt will improve 10m walk speed to >=1.0m/s. [] Progressing: [] Met: [] Not Met: [] Adjusted  3. Pt will report dizziness/headache <1/10 to improve tolerance of functional mobility and tasks. [] Progressing: [] Met: [] Not Met: [] Adjusted  4. Pt will improve FGA to 27/30 to demonstrate reduced all risk. [] Progressing: [] Met: [] Not Met: [] Adjusted  5. Pt independent with HEP. [] Progressing: [] Met: [] Not Met: [] Adjusted  6. Pt to report improved confidence with ambulating in community w/ AD as needed. [] Progressing: [] Met: [] Not Met: [] Adjusted    ASSESSMENT:   Pt demonstrates significant improvements in all mobility. Her FGA score is improved to 26/30 approaching her LT goal. She is gradually returning to functional activities including laundry, babysitting and most recently driving. Pt will likely be ready for D/C earlier than expected. Will reassess next week. Pt will continue to benefit from skilled PT to improve functional mobility, reduce fall risk and maximize independence. Treatment/Activity Tolerance:  [x] Patient tolerated treatment well [] Patient limited by fatique  [] Patient limited by pain  [] Patient limited by other medical complications  [] Other: patient limited by symptoms (ringing in ears/headache)    Overall Progression Towards Functional goals/ Treatment Progress Update:  [x] Patient is progressing as expected towards functional goals listed. [] Progression is slowed due to complexities/Impairments listed. [] Progression has been slowed due to co-morbidities.   [] Plan just implemented, too soon to assess goals progression <30days   [] Goals require adjustment due to lack of progress  [] Patient is not progressing as expected and requires additional follow up with physician  [] Other    Prognosis for POC: [x] Good [] Fair  [] Poor    Patient requires continued skilled intervention: [x] Yes  [] No        PLAN: 2/week for 6 weeks  [x] Continue per plan of care [] Alter current plan (see comments)  [] Plan of care initiated [] Hold pending MD visit [] Discharge    Electronically signed by: Trace Ibanez PT, DPT    Note: If patient does not return for scheduled/recommended follow up visits, this note will serve as a discharge from care along with the most recent update on progress.

## 2023-02-01 NOTE — DISCHARGE SUMMARY
[facility]  [address] [place] [address], [address] [place] [address]  Phone: [phone]   Fax: [phone]    Occupational Therapy Treatment Note/ Discharge Summary    Date:  2023     Patient Name:  Jose F Prescott    :  1955  MRN: [id number]      Medical Diagnosis Information:  Diplopia [H53.2]  Cerebral infarction, unspecified [I63.9]   Treatment Diagnosis Information:  Z73.6 (ICD-10-CM) Limitation of activities due to disability    Insurance/Certification information: Medicare  Physician Information:  Jaswant Das MD   Plan of care signed:    yes    Date of Patient follow up with Physician: Neuro-opthalmologist: 2022     Progress Report: [x]  Yes/Discharge Summary  []  No     Date Range for reporting period:  Beginnin2022  Endin2023    Progress report due: 8351     Recertification due (POC duration/ or 90 days whichever is less): 2022     Visit # Insurance Allowable Auth Needed     BMN No     Latex Allergy:  [x]NO      []YES  Preferred Language for Healthcare:   [x]English       []other:    Pain level:  0/10     SUBJECTIVE: Pt stated ophthalmologist cleared patient to drive. Pt stated she drove to clinic this morning without issues (side roads only). Pt states she feels that her vision is back to normal and tingling in ears continues to improve. Pt stated she is carrying laundry basket upstairs, caring for young children and making her bed without symptoms. Pt stated she has symptoms with prolong standing (>20 minutes) and in very noisy environments () requiring her to sit down for rest.     OBJECTIVE:   Observation:  Test measurements:      Functional Scale:   Score: 100/100 (2023)    RESTRICTIONS/PRECAUTIONS: fall risk    Therapeutic Activities:    Activity #1: Pt in stance to participate in ball throw underhand and overhand catch and overhand throw on bounce and on the fly.  Pt completed with no difficulty - pt denied being symptomatic. Activity #2: Sentence copying x3 minutes for goal reassessment - pt completed with no difficulty reporting quality of handwriting to be at baseline. Pt denied fatigue with writing. Patient Education:   Drive on highways initially with minimal traffic and to exit highway if she were to become symptomatic - verb understanding  Progress made so far and goal achievement - verb understanding  Readiness for discharge - verb understanding and in agreement with discharge. Therapeutic Exercises: Exercises in bold performed in department today. Items not bolded are carried forward from prior visits for continuity of the record. Exercise/Equipment Resistance/Repetitions HEP Other comments   Bilateral MP flexion (with DIP/PIP extension)   10 reps [x] Increased difficulty requiring mod-max VCs and modeling       []        []        []        []        []        []        []        []          []         []        []        []        []        []        []        []        []      Home Exercise Program:  Bilateral MP flexion (with DIP/PIP extension)  2 sets x1-2 minutes daily    Therapeutic Exercise:   [] (22127) Provided verbal/tactile cueing for activities related to strengthening, flexibility, endurance, ROM. Includes review/progression of HEP activities related to strengthening, flexibility, endurance, AROM, proximal shoulder and UE for functional transfers/mobility, self-care, IADLs, and community re-entry    Therapeutic Activities:    [x] (76374) Provided verbal/tactile cueing for activities related to improving balance, coordination, kinesthetic sense, posture, motor skill, proprioception and motor activation to allow for proper function of core, proximal shoulder and UE with self-care, and ADLs, IADLs, functional mobility, work-related and leisure based activities.  Includes review/progression of HEP activities to improve balance, coordination, kinesthetic sense, posture, motor skill, proprioception, proximal shoulder and UE for functional transfers/mobility, self-care, IADLs, and community re-entry    Sensory Integration Techniques:  [] (02979)Provided verbal/tactile feedback to enhance sensory processing and promoting responses that are adaptive to environmental demands    Self-Care/Home Management Training:  [] (94848) Provided training and education in restorative and compensatory strategies/activity modifications in activities of daily living, meal preparation, laundry and other various house maintenance activities. Provided training and education in use of adaptive equipment/devices and assistive technology, as needed, to promote participation and independence. Cognitive Function:  [] (31197 x15 minutes, 80566 +15 minutes) Integrated activities that address attention, memory, reasoning, executive functioning, problem solving, and/or pragmatic functioning in addition to compensatory strategies to manage the performance of an activity (for example, managing time or schedules, initiating, organizing, and sequencing tasks). NMR:  [] (80473) During functional activities/therapeutic exercises, provided facilitation of normal movement patterns and provided handling/verbal cues to promote postural alignment and stability during static and dynamic movement (sitting or standing).  Activities may also include visual perceptual training, proprioception training, and balance retraining during functional activities    Manual Treatments:    [] (86168) Provided manual therapy to mobilize UB for the purpose of modulating pain, promoting relaxation,  increasing ROM, reducing/eliminating soft tissue swelling/inflammation/restriction, improving soft tissue extensibility and allowing for proper ROM for normal function with self-care, functional mobility, transfers, reaching and lifting    Modalities:     [] Electrical Stimulation (10428):     [] Ultrasound (11974):    Charges:  Timed Code Treatment Minutes: 30   Total Treatment Minutes: 30      [] EVAL (LOW) 50580 (typically 20 minutes face-to-face)  [] EVAL (MOD) 40248 (typically 30 minutes face-to-face)  [] EVAL (HIGH) 72473 (typically 45 minutes face-to-face)  [] RE-EVAL     [] JF(43342) x      [] NMR (12336) x       [] Manual (75234) x       [x] TA (41441) x15 (1)   [] ES(attended) (69515)       [] ES (un) (15877)  [x] Other: Phy Per Test (90535) x15 (1)    GOALS:   Patient stated goal: return to driving, increase mobility, no ringing in ears, and improve vision  [] Progressing: [x] Met: [] Not Met: [] Adjusted     Therapist goals for Patient:      Short Term Goals: To be achieved in: 4 weeks  1. Pt will improve score on UEFS to 75/100 for a subjective report of decreased difficulty with completing every day activities. [] Progressing: [x] Met: 1/19/2023 [] Not Met: [] Adjusted  2. Pt will write for 1 minute in cursive style with 75% accuracy to demo improved tolerance with fine motor activities suggesting improved strength of intrinsic muscles of R hand. [] Progressing: [x] Met: [] Not Met: [] Adjusted  3. Pt will complete 1 IADL task per pt preference in stance x3 minutes to improve stamina with vision and standing activities to facilitate return to PLOF. [] Progressing: [x] Met: [] Not Met: [] Adjusted  4. Pt will be Min A with HEP/Home activities program to facilitate independence with carryover from sessions and to progress towards returning to PLOF  [] Progressing: [x] Met: [] Not Met: [] Adjusted     Long Term Goals: To be achieved in: 8 weeks  1. Pt will improve score on UEFS to 90/100 for a subjective report of decreased difficulty with completing every day activities. [] Progressing: [x] Met: [] Not Met: [] Adjusted  2. Pt will write for 3 minutes in cursive style with 90% accuracy to demo improved tolerance with fine motor activities suggesting improved strength of intrinsic muscles of R hand.    [] Progressing: [x] Met: [] Not Met: [] Adjusted  3. Pt will complete 1 IADL task per pt preference in stance x5 minutes to improve stamina with vision and standing activities to facilitate return to PLOF. [] Progressing: [x] Met: [] Not Met: [] Adjusted  4. Pt will be Independent with HEP/Home activities program to facilitate independence with carryover from sessions and to progress towards returning to PLOF  [] Progressing: [x] Met: [] Not Met: [] Adjusted    ASSESSMENT:    Pt met all goals this date and reported back to baseline level of function. Pt unable to state additional problem areas in ADLs, IADLs, community reintegration or driving. Pt is pleased with progress made in therapy and appropriate for discharge. Pt is now discharged. Treatment/Activity Tolerance:  [x] Patient tolerated treatment well [] Patient limited by fatique  [] Patient limited by pain  [] Patient limited by other medical complications  [] Other:     Overall Progression Towards Functional goals/ Treatment Progress Update:  [x] Patient is progressing as expected towards functional goals listed. [] Progression is slowed due to complexities/Impairments listed. [] Progression has been slowed due to co-morbidities. [] Plan just implemented, too soon to assess goals progression <30days   [] Goals require adjustment due to lack of progress  [] Patient is not progressing as expected and requires additional follow up with physician  [] Other    Prognosis for POC: [x] Good [] Fair  [] Poor    Patient requires continued skilled intervention: [x] Yes  [] No        PLAN:   [] Continue per plan of care [] Alter current plan (see comments)  [] Plan of care initiated [] Hold pending MD visit [x] Discharge    Electronically signed by: Wili Omer OT    Note: If patient does not return for scheduled/recommended follow up visits, this note will serve as a discharge from care along with the most recent update on progress.      Functional Scale:    [] LEFS   [] ABC Scale  [x] UEFI/UEFS  [] Functional Gait Index  [] Modified Oswestry  [] MUNOZ  [] NDI    [] LLIS  [] [address]    [] Other:     Initial Score (% disability):  42.5  Discharge Score (% disability):  0      Episode of Care:   # Visits:  4  Duration (# Weeks):  7      Treatment Approach:  [] Surgical  [x] Conservative    Special Certifications/Equipment Used (check all that apply):  [] Lymphedema  [] LSVT  [] OMT-C   [] Women's Health  [] Vestibular   [] Music Treadmill  [] Dry Needling  [] CHT  [] Other:     Co-morbidities/Complexities:   # of relevant co-morbidities:  2     []None          []Hx of COVID   Arthritic conditions   []Rheumatoid arthritis (M05.9)  [x]Osteoarthritis (M19.91)  []Gout    Cardiovascular conditions   []Hypertension (I10)  [x]Hyperlipidemia (E78.5)  []Angina pectoris (I20)  []Atherosclerosis (I70)  []Pacemaker  []Hx of CABG/stent/  cardiac surgeries    Musculoskeletal conditions   []Disc pathology   []Congenital spine pathologies   []Osteoporosis (M81.8)  []Osteopenia (M85.8)  []Scoliosis         Endocrine conditions   []Hypothyroid (E03.9)  []Hyperthyroid Gastrointestinal conditions   []Constipation (K59.00)  [] Diarrhea    Metabolic conditions   []Morbid obesity (E66.01)  []Diabetes type 1(E10.65) or 2 (E11.65)   []Neuropathy (G60.9)      Cardio/Pulmonary conditions   []Asthma (J45)  []Coughing   []COPD (J44.9)  []CHF  []A-fib    Psychological Disorders  []Anxiety (F41.9)  []Depression (F32.9)   []Other:    Developmental Disorders  []Autism (F84.0)  []CP (G80)  []Down Syndrome (Q90.9)  []Developmental delay      Neurological conditions  []Prior Stroke (I69.30)  []Parkinson's (G20)  []Encephalopathy (G93.40)  []MS (G35)  []Post-polio (G14)  []SCI  []TBI  []ALS Other conditions  []Fibromyalgia (M79.7)  []Vertigo  []Syncope  []Kidney Failure  []Cancer      []currently undergoing                treatment  []Pregnancy  []Incontinence    Prior surgeries  []involved limb  []previous spinal surgery  [] section birth  []hysterectomy  []bowel / bladder surgery  []other relevant surgeries  suburethral sling procedure

## 2023-02-02 ENCOUNTER — APPOINTMENT (OUTPATIENT)
Dept: PHYSICAL THERAPY | Age: 68
End: 2023-02-02
Payer: MEDICARE

## 2023-02-07 ENCOUNTER — APPOINTMENT (OUTPATIENT)
Dept: PHYSICAL THERAPY | Age: 68
End: 2023-02-07
Payer: MEDICARE

## 2023-02-07 ENCOUNTER — APPOINTMENT (OUTPATIENT)
Dept: OCCUPATIONAL THERAPY | Age: 68
End: 2023-02-07
Payer: MEDICARE

## 2023-02-09 ENCOUNTER — APPOINTMENT (OUTPATIENT)
Dept: PHYSICAL THERAPY | Age: 68
End: 2023-02-09
Payer: MEDICARE

## 2023-02-09 ENCOUNTER — APPOINTMENT (OUTPATIENT)
Dept: OCCUPATIONAL THERAPY | Age: 68
End: 2023-02-09
Payer: MEDICARE

## 2023-02-13 ENCOUNTER — APPOINTMENT (OUTPATIENT)
Dept: OCCUPATIONAL THERAPY | Age: 68
End: 2023-02-13
Payer: MEDICARE

## 2023-02-13 ENCOUNTER — APPOINTMENT (OUTPATIENT)
Dept: PHYSICAL THERAPY | Age: 68
End: 2023-02-13
Payer: MEDICARE

## 2023-02-15 ENCOUNTER — APPOINTMENT (OUTPATIENT)
Dept: OCCUPATIONAL THERAPY | Age: 68
End: 2023-02-15
Payer: MEDICARE

## 2023-02-15 ENCOUNTER — APPOINTMENT (OUTPATIENT)
Dept: PHYSICAL THERAPY | Age: 68
End: 2023-02-15
Payer: MEDICARE

## 2023-02-21 ENCOUNTER — APPOINTMENT (OUTPATIENT)
Dept: OCCUPATIONAL THERAPY | Age: 68
End: 2023-02-21
Payer: MEDICARE

## 2023-02-21 ENCOUNTER — APPOINTMENT (OUTPATIENT)
Dept: PHYSICAL THERAPY | Age: 68
End: 2023-02-21
Payer: MEDICARE

## 2023-02-23 ENCOUNTER — APPOINTMENT (OUTPATIENT)
Dept: OCCUPATIONAL THERAPY | Age: 68
End: 2023-02-23
Payer: MEDICARE

## 2023-02-23 ENCOUNTER — APPOINTMENT (OUTPATIENT)
Dept: PHYSICAL THERAPY | Age: 68
End: 2023-02-23
Payer: MEDICARE

## 2023-02-28 ENCOUNTER — HOSPITAL ENCOUNTER (OUTPATIENT)
Dept: NON INVASIVE DIAGNOSTICS | Age: 68
Discharge: HOME | End: 2023-02-28
Payer: MEDICARE

## 2023-02-28 ENCOUNTER — APPOINTMENT (OUTPATIENT)
Dept: OCCUPATIONAL THERAPY | Age: 68
End: 2023-02-28
Payer: MEDICARE

## 2023-02-28 ENCOUNTER — HOSPITAL ENCOUNTER (OUTPATIENT)
Dept: PHYSICAL THERAPY | Age: 68
Setting detail: THERAPIES SERIES
Discharge: HOME | End: 2023-02-28
Payer: MEDICARE

## 2023-02-28 DIAGNOSIS — I49.9 CARDIAC ARRHYTHMIA, UNSPECIFIED CARDIAC ARRHYTHMIA TYPE: ICD-10-CM

## 2023-02-28 PROCEDURE — 97112 NEUROMUSCULAR REEDUCATION: CPT

## 2023-02-28 PROCEDURE — 93225 XTRNL ECG REC<48 HRS REC: CPT

## 2023-02-28 PROCEDURE — 93226 XTRNL ECG REC<48 HR SCAN A/R: CPT

## 2023-02-28 PROCEDURE — 97530 THERAPEUTIC ACTIVITIES: CPT

## 2023-02-28 NOTE — DISCHARGE SUMMARY
[facility]  [address] [place] [address],  Carlin Hastings [address]  Phone: [phone]   Fax: [phone]    Physical Therapy Treatment Note/ Discharge Summary:     Date:  2023    Patient Name:  Veronika Vargas    :  1955  MRN: [id number]    Medical/Treatment Diagnosis Information:  Diagnosis: H53.2 (ICD-10-CM) - Diplopia  I63.9 (ICD-10-CM) - Acute CVA (cerebrovascular accident)    R26 impaired gait and mobility due to dizziness, diplopia  Insurance/Certification information:  PT Insurance Information: Medicare  Physician Information:  Christopher Boykin MD   Plan of care signed:    [x] Yes  [] No    Date of Patient follow up with Physician:      Progress Report: [x]  Yes  []  No     Date Range for reporting period:  Beginnin23  Endin23    Progress report due (10 Rx/or 30 days whichever is less):      Recertification due (POC duration/ or 90 days whichever is less):      Visit # Insurance Allowable Auth Needed    BMN []Yes   [x]No     RESTRICTIONS/PRECAUTIONS:   Latex Allergy:  [x]NO      []YES  Preferred Language for Healthcare:   [x]English       []other:  Functional Scale: SIS-16: 69% ability; FGA  ; 10m walk 0.90m/s  Date assessed:22    Functional Scale: SIS-16: 90% ability;   Date assessed:23  Functional Scale: FGA   Date assessed:23    Functional Scale: SIS-16: 100% ability; FGA ; 10m walk 1.04m/s  Date assessed:23    Functional Scale:    [] LEFS   [] ABC Scale  [] UEFI/UEFS  [x] Functional Gait Index  [] Modified Oswestry  [] MUNOZ  [] NDI    [] LLIS  [] [facility] [address]    [] Other:     Initial Score (% disability):  33  Discharge Score (% disability):  3      Episode of Care:   # Visits:  6  Duration (# Weeks):  10      Treatment Approach:  [] Surgical  [x] Conservative    Special Certifications/Equipment Used (check all that apply):  [] Lymphedema  [] LSVT  [] OMT-C   [] Women's Health  [x] Vestibular   [] Music Treadmill  [] Dry Needling  [] CHT  [] Other:     Co-morbidities/Complexities (which will affect course of rehabilitation):   []None          []Hx of COVID   Arthritic conditions   []Rheumatoid arthritis (M05.9)  [x]Osteoarthritis (M19.91)  []Gout    Cardiovascular conditions   []Hypertension (I10)  [x]Hyperlipidemia (E78.5)  []Angina pectoris (I20)  []Atherosclerosis (I70)  []Pacemaker  []Hx of CABG/stent/  cardiac surgeries    Musculoskeletal conditions   []Disc pathology   []Congenital spine pathologies   []Osteoporosis (M81.8)  []Osteopenia (M85.8)  []Scoliosis         Endocrine conditions   []Hypothyroid (E03.9)  []Hyperthyroid Gastrointestinal conditions   []Constipation (K59.00)  [] Diarrhea    Metabolic conditions   []Morbid obesity (E66.01)  []Diabetes type 1(E10.65) or 2 (E11.65)   []Neuropathy (G60.9)      Cardio/Pulmonary conditions   []Asthma (J45)  []Coughing   []COPD (J44.9)  []CHF  []A-fib    Psychological Disorders  []Anxiety (F41.9)  []Depression (F32.9)   []Other:    Developmental Disorders  []Autism (F84.0)  []CP (G80)  []Down Syndrome (Q90.9)  []Developmental delay      Neurological conditions  []Prior Stroke (I69.30)  []Parkinson's (G20)  []Encephalopathy (G93.40)  []MS (G35)  []Post-polio (G14)  []SCI  []TBI  []ALS Other conditions  []Fibromyalgia (M79.7)  []Vertigo  []Syncope  []Kidney Failure  []Cancer      []currently undergoing                treatment  []Pregnancy  []Incontinence    Prior surgeries  []involved limb  []previous spinal surgery  [] section birth  []hysterectomy  []bowel / bladder surgery  []other relevant surgeries  suburethral sling procedure          Pain level:  0/10   Dizziness: 0/10       SUBJECTIVE:  Pt reports she has been away from PT for past month due to her mother passing away. She has been walking a lot. She has been driving long distance. She has no further c/o dizziness, headaches or balance difficulties.         OBJECTIVE:    Observation: Pt amb w/ slightly limited trunk/head rotation, good rom, no veering   Test measurements:    MMT:   R hip flex: 5/5  L hip flex: 4+/5 (pt reports this has always been her weak side). Pt states she is walking and stair climbing a lot to improve this strength. Exercises/Interventions: Exercises in bold performed in department today. Items not bolded are carried forward from prior visits for continuity of the record. Exercise/Equipment Resistance/Repetitions HEP Other comments   Nustep L4, 8 min.  [] LE 10; UE 11  Increased tinnitus R> L 6/10 w/ increased speed past 70spm,    Marching 20x w/ 2.5 lb weight [x] Standing, UE support near-by   Cervical AROM R/L rotation 20 sec x 4 [x] VC for postural alignment and reduced upper trap compensation    R/L sidebending 20 sec x 4 [x] VC for postural alignment and reduced upper trap compensation   Imaginary target  10x, horizontal  10x, vertical [x] Pt w/ difficulty maintaining neutral alignment, rests in cervical extension at all times. Error in maintaining target both directions. Horizontal and vertical for HEP   Head-eye coordination 10x horizontal  10x vertical [x] Decreased speed and errors in degrees of R neck rotation. Difficulty w/ achieving sufficient cervical flexion. []    Pec stretch  20 sec  [x] Trialed standing in door way and supine w/ UE abducted off edge of mat for HEP     []      []      []    TA: reviewed pt's goals and progress to date. Pt prefers to use B Gystkking poles for trail/beach walking. Adjusted to appropriate height and educ pt on sequence for proper use. Pt quickly able to return demo.         Dynamic balance     Amb with head turns/ nods 85' x 4 each Slight veering to L with turning, no dizziness or LOB   Amb with 180* turns  X10 No LOB, normal speed   Amb with quick stop/starts w/ forward/backwards walk X 12 No LOB   Sidestepping 40' x 1 w/ B UE abd/add    Retrowalking 40'    Amb w/ high knees + big arms 85' x 2 No LOB            Home Exercise Program:   Access Code: [email or web address]  URL: [email or web address]  Date: 01/17/2023  Prepared by: Marlyn Paul  Exercises  Standing Marching - 1 x daily - 5 x weekly - 2-3 sets - 10 reps  Seated Cervical Rotation AROM - 1 x daily - 7 x weekly - 3 sets - 3 reps - 20 hold  Seated Cervical Sidebending AROM - 1 x daily - 7 x weekly - 3 reps - 20 hold  Imaginary target- horizontal and vertical, 5reps, 3x per day    Therapeutic Exercise:   [] (61396) Provided verbal/tactile cueing for activities related to strengthening, flexibility, endurance, ROM for improvements in LE, proximal hip, and core control with self-care, mobility, lifting, ambulation.    NMR:  [x] (01048) Provided verbal/tactile cueing for activities related to improving balance, coordination, kinesthetic sense, posture, motor skill, proprioception to assist with LE, proximal hip, and core control in self-care, mobility, lifting, ambulation and eccentric single leg control.     Therapeutic Activities:    [x] (33064) Provided verbal/tactile cueing for activities related to improving balance, coordination, kinesthetic sense, posture, motor skill, proprioception and motor activation to allow for proper function of core, proximal hip and LE with self-care and ADLs and functional mobility.     Gait Training:    [] (00958) Provided training and instruction to the patient for proper LE, core and proximal hip recruitment and positioning and eccentric body weight control with ambulation re-education including up and down stairs     Manual Treatments:  PROM / STM / Oscillations-Mobs:  G-I, II, III, IV (PA's, Inf., Post.)  [] (03700) Provided manual therapy to mobilize LE, proximal hip and/or LS spine soft tissue/joints for the purpose of modulating pain, promoting relaxation,  increasing ROM, reducing/eliminating soft tissue swelling/inflammation/restriction, improving soft tissue extensibility and allowing for proper ROM for normal function with  self-care, mobility, lifting and ambulation. Home Exercise Program:    [] (06185) Reviewed/Progressed HEP activities related to strengthening, flexibility, endurance, ROM of core, proximal hip and LE for functional self-care, mobility, lifting and ambulation/stair navigation   [x] (15561) Reviewed/Progressed HEP activities related to improving balance, coordination, kinesthetic sense, posture, motor skill, proprioception of core, proximal hip and LE for self-care, mobility, lifting, and ambulation/stair navigation      Modalities:    [] Electric Stimulation:   [] Ultrasound:   [] Other:       Charges:  Timed Code Treatment Minutes: NM 15; TA 17   Total Treatment Minutes: 32      [] EVAL (LOW) 09649 (typically 20 minutes face-to-face)  [] EVAL (MOD) 99986 (typically 30 minutes face-to-face)  [] EVAL (HIGH) 58796 (typically 45 minutes face-to-face)  [] RE-EVAL     [] ZR(78966) x      [x] NMR (43754) x  1   [] Manual ([phone]) x       [x] TA (45691) x   1   [] Gait Training [phone]) x       [] ES(attended) (34292)  [] ES (un) (72767)   [] DRY NEEDLE 1 OR 2 MUSCLES  [] DRY NEEDLE 3+ MUSCLES  [] Mech Traction (24760)  [] Ultrasound (40559)  [] Other:    GOALS: Goals established 12/19/22   Patient stated goal: to get rid of dizziness and walk independently  [] Progressing: [x] Met: [] Not Met: [] Adjusted    Therapist goals for Patient:   Short Term Goals: To be achieved in: 3 weeks   1. Independent in HEP and progression per patient tolerance. [] Progressing: [x] Met: [] Not Met: [] Adjusted   2. Pt will report dizziness and headache <3/10 to improve tolerance of functional mobility and tasks. Can intermittently go up to 4/10, but mostly <3/10  [] Progressing: [x] Met: [] Not Met: [] Adjusted  3. Pt will amb w/ SPC indoors mod I and outdoors supervision. [] Progressing: [x] Met: [] Not Met: [] Adjusted  4. Pt will improve FGA to 23/30 to demonstrate reduced all risk.  29/30  [] Progressing: [x] Met: [] Not Met: [] Adjusted    Long Term Goals: To be achieved in: 6 weeks  1. Pt will improve SIS-16 score to 85% ability. 100%  [] Progressing: [x] Met: [] Not Met: [] Adjusted  2. Pt will improve 10m walk speed to >=1.0m/s. 1.04m/s  [] Progressing: [x] Met: [] Not Met: [] Adjusted  3. Pt will report dizziness/headache <1/10 to improve tolerance of functional mobility and tasks. 0/10 consistently  [] Progressing: [x] Met: [] Not Met: [] Adjusted  4. Pt will improve FGA to 27/30 to demonstrate reduced all risk. 29/30  [] Progressing: [x] Met: [] Not Met: [] Adjusted  5. Pt independent with HEP. [] Progressing: [x] Met: [] Not Met: [] Adjusted  6. Pt to report improved confidence with ambulating in community w/ AD as needed. Pt amb on trails and sand w/ trekking poles. [] Progressing: [x] Met: [] Not Met: [] Adjusted      ASSESSMENT:   Pt met 4/4 ST and 6/6 LT goals. Pt has had full resolution of diplopia and dizziness. Her balance score on the FGA has improved th 29/30. She had a large time gap in PT due to a death in the family, but she is now ready for D/C as she has returned to all previous functional tasks. She has been educated on gradual return to cardiovascular exercise according to [facility]. Pt in agreement w/ D/C and satisfied w/ progress. Treatment/Activity Tolerance:  [x] Patient tolerated treatment well [] Patient limited by fatique  [] Patient limited by pain  [] Patient limited by other medical complications  [] Other: patient limited by symptoms (ringing in ears/headache)    Overall Progression Towards Functional goals/ Treatment Progress Update:  [x] Patient is progressing as expected towards functional goals listed. [] Progression is slowed due to complexities/Impairments listed. [] Progression has been slowed due to co-morbidities.   [] Plan just implemented, too soon to assess goals progression <30days   [] Goals require adjustment due to lack of progress  [] Patient is not progressing as expected and requires additional follow up with physician  [] Other    Prognosis for POC: [x] Good [] Fair  [] Poor    Patient requires continued skilled intervention: [x] Yes  [] No        PLAN: 2/week for 6 weeks  [] Continue per plan of care [] Alter current plan (see comments)  [] Plan of care initiated [] Hold pending MD visit [x] Discharge    Electronically signed by: Marcelo Client, PT, DPT    Note: If patient does not return for scheduled/recommended follow up visits, this note will serve as a discharge from care along with the most recent update on progress.

## 2024-03-10 ENCOUNTER — ANESTHESIA EVENT (OUTPATIENT)
Dept: ENDOSCOPY | Age: 69
End: 2024-03-10
Payer: MEDICARE

## 2024-03-11 ENCOUNTER — HOSPITAL ENCOUNTER (OUTPATIENT)
Age: 69
Setting detail: OUTPATIENT SURGERY
Discharge: HOME | End: 2024-03-11
Attending: INTERNAL MEDICINE | Admitting: INTERNAL MEDICINE
Payer: MEDICARE

## 2024-03-11 ENCOUNTER — ANESTHESIA (OUTPATIENT)
Dept: ENDOSCOPY | Age: 69
End: 2024-03-11
Payer: MEDICARE

## 2024-03-11 VITALS
HEIGHT: 68 IN | WEIGHT: 249 LBS | DIASTOLIC BLOOD PRESSURE: 65 MMHG | BODY MASS INDEX: 37.74 KG/M2 | TEMPERATURE: 97 F | HEART RATE: 67 BPM | SYSTOLIC BLOOD PRESSURE: 121 MMHG | OXYGEN SATURATION: 100 % | RESPIRATION RATE: 16 BRPM

## 2024-03-11 DIAGNOSIS — Z12.11 COLON CANCER SCREENING: ICD-10-CM

## 2024-03-11 PROCEDURE — 2580000003 HC RX 258

## 2024-03-11 PROCEDURE — 7100000010 HC PHASE II RECOVERY - FIRST 15 MIN: Performed by: INTERNAL MEDICINE

## 2024-03-11 PROCEDURE — 3609010600 HC COLONOSCOPY POLYPECTOMY SNARE/COLD BIOPSY: Performed by: INTERNAL MEDICINE

## 2024-03-11 PROCEDURE — 7100000011 HC PHASE II RECOVERY - ADDTL 15 MIN: Performed by: INTERNAL MEDICINE

## 2024-03-11 PROCEDURE — 2580000003 HC RX 258: Performed by: ANESTHESIOLOGY

## 2024-03-11 PROCEDURE — 6360000002 HC RX W HCPCS

## 2024-03-11 PROCEDURE — 3700000001 HC ADD 15 MINUTES (ANESTHESIA): Performed by: INTERNAL MEDICINE

## 2024-03-11 PROCEDURE — 2709999900 HC NON-CHARGEABLE SUPPLY: Performed by: INTERNAL MEDICINE

## 2024-03-11 PROCEDURE — 3700000000 HC ANESTHESIA ATTENDED CARE: Performed by: INTERNAL MEDICINE

## 2024-03-11 PROCEDURE — 88305 TISSUE EXAM BY PATHOLOGIST: CPT

## 2024-03-11 RX ORDER — PROPOFOL 10 MG/ML
INJECTION, EMULSION INTRAVENOUS PRN
Status: DISCONTINUED | OUTPATIENT
Start: 2024-03-11 | End: 2024-03-11 | Stop reason: SDUPTHER

## 2024-03-11 RX ORDER — LIDOCAINE HYDROCHLORIDE 20 MG/ML
INJECTION, SOLUTION INTRAVENOUS PRN
Status: DISCONTINUED | OUTPATIENT
Start: 2024-03-11 | End: 2024-03-11 | Stop reason: SDUPTHER

## 2024-03-11 RX ORDER — SODIUM CHLORIDE, SODIUM LACTATE, POTASSIUM CHLORIDE, CALCIUM CHLORIDE 600; 310; 30; 20 MG/100ML; MG/100ML; MG/100ML; MG/100ML
INJECTION, SOLUTION INTRAVENOUS CONTINUOUS
Status: DISCONTINUED | OUTPATIENT
Start: 2024-03-11 | End: 2024-03-11 | Stop reason: HOSPADM

## 2024-03-11 RX ORDER — SODIUM CHLORIDE, SODIUM LACTATE, POTASSIUM CHLORIDE, CALCIUM CHLORIDE 600; 310; 30; 20 MG/100ML; MG/100ML; MG/100ML; MG/100ML
INJECTION, SOLUTION INTRAVENOUS CONTINUOUS PRN
Status: DISCONTINUED | OUTPATIENT
Start: 2024-03-11 | End: 2024-03-11 | Stop reason: SDUPTHER

## 2024-03-11 RX ADMIN — SODIUM CHLORIDE, SODIUM LACTATE, POTASSIUM CHLORIDE, AND CALCIUM CHLORIDE: .6; .31; .03; .02 INJECTION, SOLUTION INTRAVENOUS at 11:36

## 2024-03-11 RX ADMIN — LIDOCAINE HYDROCHLORIDE 100 MG: 20 INJECTION, SOLUTION INTRAVENOUS at 11:40

## 2024-03-11 RX ADMIN — SODIUM CHLORIDE, POTASSIUM CHLORIDE, SODIUM LACTATE AND CALCIUM CHLORIDE: 600; 310; 30; 20 INJECTION, SOLUTION INTRAVENOUS at 10:50

## 2024-03-11 RX ADMIN — PROPOFOL 25 MG: 10 INJECTION, EMULSION INTRAVENOUS at 11:43

## 2024-03-11 RX ADMIN — PROPOFOL 50 MG: 10 INJECTION, EMULSION INTRAVENOUS at 11:40

## 2024-03-11 RX ADMIN — PROPOFOL 150 MCG/KG/MIN: 10 INJECTION, EMULSION INTRAVENOUS at 11:41

## 2024-03-11 RX ADMIN — PROPOFOL 25 MG: 10 INJECTION, EMULSION INTRAVENOUS at 11:42

## 2024-03-11 ASSESSMENT — PAIN SCALES - GENERAL: PAINLEVEL_OUTOF10: 0

## 2024-03-11 ASSESSMENT — PAIN - FUNCTIONAL ASSESSMENT: PAIN_FUNCTIONAL_ASSESSMENT: 0-10

## 2024-03-11 NOTE — ANESTHESIA PRE PROCEDURE
Department of Anesthesiology  Preprocedure Note       Name:  Tona Ortega   Age:  68 y.o.  :  1955                                          MRN:  [id number]         Date:  3/11/2024      Surgeon: Surgeon(s):  Ade Hart MD    Procedure: Procedure(s):  COLONOSCOPY    Medications prior to admission:   Prior to Admission medications    Medication Sig Start Date End Date Taking? Authorizing Provider   amLODIPine (NORVASC) 5 MG tablet Take 1 tablet by mouth daily 22   Cecile Bullard MD   clopidogrel (PLAVIX) 75 MG tablet Take 1 tablet by mouth daily for 21 doses 22  Cecile Bullard MD   rosuvastatin (CRESTOR) 40 MG tablet Take 1 tablet by mouth nightly 22   Cecile Bullard MD   aspirin 81 MG EC tablet Take 81 mg by mouth daily    Carter Louie MD   Multiple Vitamins-Minerals (THERAPEUTIC MULTIVITAMIN-MINERALS) tablet Take 1 tablet by mouth daily    Carter Louie MD   b complex vitamins capsule Take 1 capsule by mouth daily    Carter Louie MD   vitamin D (CHOLECALCIFEROL) 25 MCG (1000 UT) TABS tablet Take 1,000 Units by mouth daily    Carter Louie MD   Omega-3 Fatty Acids (FISH OIL) 1000 MG capsule Take 1,000 mg by mouth daily    Carter Louie MD       Current medications:    No current facility-administered medications for this encounter.       Allergies:  No Known Allergies    Problem List:    Patient Active Problem List   Diagnosis Code   • Thalamic stroke (HCC) I63.81   • Midbrain stroke I63.9   • HLD (hyperlipidemia) E78.5   • Benign essential HTN I10   • Vertigo R42       Past Medical History:        Diagnosis Date   • Stroke        Past Surgical History:  History reviewed. No pertinent surgical history.    Social History:    Social History     Tobacco Use   • Smoking status: Never   • Smokeless tobacco: Never   Substance Use Topics   • Alcohol use: Yes     Comment: occ                                Counseling given: Not

## 2024-03-11 NOTE — H&P
02/28/2023 3   Final    Average Heart Rate 02/28/2023 78  BPM Final    Holter Max Heart Rate 02/28/2023 110  BPM Final    Min Heart Rate 02/28/2023 63  BPM Final    Max Heart Rate Time/Date 02/28/2023 [id number]   Final    Min Heart Rate Time/Date 02/28/2023 20230302082102   Final    Diagnosis 02/28/2023 Minimum Heart Rate: 63 BPMMaximum Heart Rate: 110 BPMRare Premature Ventricular ComplexesOccasional Premature Atrial ComplexesPatient Diary Not ReturnedConfirmed by ELIZABETH PIEDRA MD ([id number]) on 3/6/2023 12:59:18 PM   Final        Imaging:  No orders to display       ASA:2    Mallampati Score: II    Sedation planned:MAC    Patient in acceptable condition for procedure:Yes    11:29 AM 3/11/2024    Ade Hart MD      Please note that some or all of this record was generated using voice recognition software. If there are any questions about the content of this document, please contact the author as some errors in transcription may have occurred.

## 2024-03-11 NOTE — OP NOTE
Colonoscopy Procedure Note    Patient: Tona Ortega MRN: [id number]     YOB: 1955  Age: 68 y.o.  Sex: female    Unit: [facility] ENDOSCOPY Room/Bed: [facility]/NONE Location: [facility]       Colonoscopy with polypectomy (cold snare)    Admitting Physician: ADE KOEHLER     Primary Care Physician: Mickey Braun MD      Preoperative Diagnosis: Pre-Op Diagnosis Codes:     * Colon cancer screening [Z12.11]      DATE OF OPERATION: 3/11/2024    OPERATIVE SURGEON: Ade Koehler MD      ANESTHESIA: Monitor Anesthesia Care      Procedure Details:    After informed consent was obtained with all risks and benefits of procedure explained and preoperative exam completed, the patient was taken to the endoscopy suite and placed in the left lateral decubitus position. Upon sequential sedation as per above, a digital rectal exam was performed and was normal.  The Olympus videocolonoscope  was inserted in the rectum and carefully advanced to the cecum. Cecum Intubated Yes. The quality of preparation was good.  The colonoscope was slowly withdrawn with careful evaluation between folds. Retroflexion in the rectum was performed.      Estimated Blood Loss: minimal    Complications:  none    Findings:   diverticulosis,  Minimal in degree, involving the sigmoid  hemorrhoids internal, Small in size  polyp(s) #1, 4 mm in size, located in the descending colon removed by cold snare and retrieved for pathology  #2, 4 mm in size, located in the descending colon removed by cold snare and retrieved for pathology  #3, 5 mm in size, located in the descending colon removed by cold snare and retrieved for pathology    Plan: Await allergy results for timing of next colonoscopy.        Signed By: Ade Koehler MD

## 2024-03-11 NOTE — DISCHARGE INSTRUCTIONS
medical care if:    You have severe belly pain.     Your stools are maroon or very bloody.   Watch closely for changes in your health, and be sure to contact your doctor if:    You have a fever.     You have nausea or vomiting.     You have a change in bowel habits (new constipation or diarrhea).     Your symptoms get worse or are not improving as expected.   Where can you learn more?  Go to https://www.[employer].net/patientEd and enter C571 to learn more about \"Colon Polyps: Care Instructions.\"  Current as of: October 19, 2023               Content Version: 14.0  © 7193-6228 [employer].   Care instructions adapted under license by [employer] [facility]. If you have questions about a medical condition or this instruction, always ask your healthcare professional. [employer] disclaims any warranty or liability for your use of this information.              Please review these discharge instructions this evening or tomorrow for  information you may have forgotten.            We want to thank you for choosing the [facility] as your health care provider. We always strive to provide you with excellent care while you are here. You may receive a survey in the mail regarding your care. We would appreciate you taking a few minutes of your time to complete this survey.

## 2024-03-11 NOTE — ANESTHESIA POSTPROCEDURE EVALUATION
Department of Anesthesiology  Postprocedure Note    Patient: Tona Ortega  MRN: [id number]  YOB: 1955  Date of evaluation: 3/11/2024    Procedure Summary       Date: 03/11/24 Room / Location: [place] [address] / [facility]    Anesthesia Start: 1136 Anesthesia Stop: 1202    Procedure: COLONOSCOPY POLYPECTOMY SNARE/COLD BIOPSY Diagnosis:       Colon cancer screening      (Colon cancer screening [Z12.11])    Surgeons: Ade Hart MD Responsible Provider: Alex Waddell MD    Anesthesia Type: general ASA Status: 3            Anesthesia Type: No value filed.    Aminta Phase I: Aminta Score: 10    Aminta Phase II: Aminta Score: 10    Anesthesia Post Evaluation    Patient location during evaluation: PACU  Patient participation: complete - patient participated  Level of consciousness: awake and alert  Pain score: 0  Airway patency: patent  Nausea & Vomiting: no nausea and no vomiting  Cardiovascular status: hemodynamically stable  Respiratory status: acceptable  Hydration status: euvolemic  Pain management: adequate    No notable events documented.

## 2024-03-11 NOTE — PROGRESS NOTES
Ambulatory Surgery/Procedure Discharge Note    Vitals:    03/11/24 1226   BP: 121/65   Pulse: 67   Resp: 16   Temp:    SpO2: 100%       In: 500 [I.V.:500]  Out: -     Restroom use offered before discharge.  Yes    Pain assessment:  level of pain (1-10, 10 severe),   Pain Level: 0    Pt and S.O./family states \"ready to go home\". Pt alert and oriented x4. IV removed. Denies N/V or pain.Voided prior to discharge. Pt tolerating po intake. Discharge instructions given to pt and  with pt permission. Pt and  verbalized understanding of all instructions. Left with all belongings,  and discharge instructions.     Patient discharged to home/self care. Patient discharged via wheel chair by transporter to waiting family/S.O.       3/11/2024 12:40 PM

## (undated) DEVICE — TRAP SPEC RETRV CLR PLAS POLYP IN LN SUCT QUIK CTCH

## (undated) DEVICE — CANNULA SAMP CO2 AD GRN 7FT CO2 AND 7FT O2 TBNG UNIV CONN

## (undated) DEVICE — SNARES COLD OVAL 10MM THIN